# Patient Record
Sex: FEMALE | Race: WHITE | NOT HISPANIC OR LATINO | URBAN - METROPOLITAN AREA
[De-identification: names, ages, dates, MRNs, and addresses within clinical notes are randomized per-mention and may not be internally consistent; named-entity substitution may affect disease eponyms.]

---

## 2017-08-16 ENCOUNTER — APPOINTMENT (OUTPATIENT)
Dept: URBAN - METROPOLITAN AREA CLINIC 219 | Age: 47
Setting detail: DERMATOLOGY
End: 2017-08-16

## 2017-08-18 ENCOUNTER — APPOINTMENT (OUTPATIENT)
Dept: URBAN - METROPOLITAN AREA CLINIC 219 | Age: 47
Setting detail: DERMATOLOGY
End: 2017-08-18

## 2017-08-18 DIAGNOSIS — L30.8 OTHER SPECIFIED DERMATITIS: ICD-10-CM

## 2017-08-18 PROBLEM — L30.9 DERMATITIS, UNSPECIFIED: Status: ACTIVE | Noted: 2017-08-18

## 2017-08-18 PROCEDURE — OTHER PRESCRIPTION: OTHER

## 2017-08-18 PROCEDURE — 99213 OFFICE O/P EST LOW 20 MIN: CPT

## 2017-08-18 PROCEDURE — OTHER TREATMENT REGIMEN: OTHER

## 2017-08-18 RX ORDER — TRIAMCINOLONE ACETONIDE 1 MG/G
APPLY CREAM TOPICAL AS DIRECTED
Qty: 1 | Refills: 3 | Status: ERX | COMMUNITY
Start: 2017-08-18

## 2017-08-18 ASSESSMENT — LOCATION SIMPLE DESCRIPTION DERM
LOCATION SIMPLE: RIGHT PRETIBIAL REGION
LOCATION SIMPLE: LEFT THIGH
LOCATION SIMPLE: LEFT PRETIBIAL REGION
LOCATION SIMPLE: RIGHT THIGH

## 2017-08-18 ASSESSMENT — LOCATION DETAILED DESCRIPTION DERM
LOCATION DETAILED: RIGHT PROXIMAL PRETIBIAL REGION
LOCATION DETAILED: LEFT PROXIMAL PRETIBIAL REGION
LOCATION DETAILED: LEFT ANTERIOR DISTAL THIGH
LOCATION DETAILED: RIGHT ANTERIOR DISTAL THIGH

## 2017-08-18 ASSESSMENT — LOCATION ZONE DERM: LOCATION ZONE: LEG

## 2017-08-18 NOTE — HPI: OTHER
Condition:: Rash
Please Describe Your Condition:: Located on the legs. The rash has been present for 5 days and is red and itching. Patient has tried treating with nystatin, desoximetasone cream, and Lotrimin cream. On Tuesday, Dr. Elam recommended patient start taking Allegra 180 mg in the morning and Benadryl 25-50 mg at night. Patient states since starting the Allegra in the morning and Benadryl at night the rash has improved. Patient denies any new medications, soaps, or detergents. Patient states the rash also flared in June 2017.

## 2017-08-18 NOTE — PROCEDURE: TREATMENT REGIMEN
Plan: Emollients (Cetaphil or Cerave cream) \\nMild Cleansers \\nTriamcinolone cream twice a day x2 weeks, then 1-2 times a week as needed
Detail Level: Simple
Continue Regimen: Allegra 180 mg each morning \\nBenadryl 25-50 mg each night (sedation warning)

## 2017-10-03 ENCOUNTER — APPOINTMENT (OUTPATIENT)
Dept: URBAN - METROPOLITAN AREA CLINIC 219 | Age: 47
Setting detail: DERMATOLOGY
End: 2017-10-03

## 2017-10-03 DIAGNOSIS — L82.0 INFLAMED SEBORRHEIC KERATOSIS: ICD-10-CM

## 2017-10-03 DIAGNOSIS — L82.1 OTHER SEBORRHEIC KERATOSIS: ICD-10-CM

## 2017-10-03 DIAGNOSIS — D22 MELANOCYTIC NEVI: ICD-10-CM

## 2017-10-03 PROBLEM — D22.4 MELANOCYTIC NEVI OF SCALP AND NECK: Status: ACTIVE | Noted: 2017-10-03

## 2017-10-03 PROCEDURE — 99213 OFFICE O/P EST LOW 20 MIN: CPT | Mod: 25

## 2017-10-03 PROCEDURE — OTHER TREATMENT REGIMEN: OTHER

## 2017-10-03 PROCEDURE — OTHER LIQUID NITROGEN: OTHER

## 2017-10-03 PROCEDURE — OTHER MIPS QUALITY: OTHER

## 2017-10-03 PROCEDURE — OTHER REASSURANCE: OTHER

## 2017-10-03 PROCEDURE — 17110 DESTRUCT B9 LESION 1-14: CPT

## 2017-10-03 ASSESSMENT — LOCATION DETAILED DESCRIPTION DERM
LOCATION DETAILED: RIGHT ELBOW
LOCATION DETAILED: RIGHT PROXIMAL DORSAL FOREARM
LOCATION DETAILED: LEFT MEDIAL MALAR CHEEK
LOCATION DETAILED: LEFT INFERIOR CENTRAL MALAR CHEEK
LOCATION DETAILED: LEFT SUPERIOR LATERAL MALAR CHEEK
LOCATION DETAILED: RIGHT LATERAL TRAPEZIAL NECK
LOCATION DETAILED: LEFT SUPERIOR MEDIAL MALAR CHEEK
LOCATION DETAILED: RIGHT INFERIOR ANTERIOR NECK
LOCATION DETAILED: LEFT INFERIOR LATERAL NECK
LOCATION DETAILED: LEFT LATERAL BUCCAL CHEEK
LOCATION DETAILED: LEFT MEDIAL TRAPEZIAL NECK

## 2017-10-03 ASSESSMENT — LOCATION ZONE DERM
LOCATION ZONE: NECK
LOCATION ZONE: FACE
LOCATION ZONE: ARM

## 2017-10-03 ASSESSMENT — LOCATION SIMPLE DESCRIPTION DERM
LOCATION SIMPLE: LEFT CHEEK
LOCATION SIMPLE: RIGHT ANTERIOR NECK
LOCATION SIMPLE: POSTERIOR NECK
LOCATION SIMPLE: LEFT ANTERIOR NECK
LOCATION SIMPLE: RIGHT ELBOW
LOCATION SIMPLE: RIGHT FOREARM

## 2017-10-03 NOTE — PROCEDURE: LIQUID NITROGEN
Add 52 Modifier (Optional): no
Consent: The patient's consent was obtained including but not limited to risks of crusting, scabbing, blistering, scarring, darker or lighter pigmentary change, recurrence, incomplete removal and infection.
Medical Necessity Information: It is in your best interest to select a reason for this procedure from the list below. All of these items fulfill various CMS LCD requirements except the new and changing color options.
Post-Care Instructions: I reviewed with the patient in detail post-care instructions. Patient is to wear sunprotection, and avoid picking at any of the treated lesions. Pt may apply Vaseline to crusted or scabbing areas.
Detail Level: Detailed
Medical Necessity Clause: This procedure was medically necessary because the lesions that were treated were:

## 2018-05-03 ENCOUNTER — APPOINTMENT (OUTPATIENT)
Dept: URBAN - METROPOLITAN AREA CLINIC 219 | Age: 48
Setting detail: DERMATOLOGY
End: 2018-05-03

## 2018-05-03 DIAGNOSIS — H01.13 ECZEMATOUS DERMATITIS OF EYELID: ICD-10-CM

## 2018-05-03 PROBLEM — H01.135 ECZEMATOUS DERMATITIS OF LEFT LOWER EYELID: Status: ACTIVE | Noted: 2018-05-03

## 2018-05-03 PROBLEM — H01.136 ECZEMATOUS DERMATITIS OF LEFT EYE, UNSPECIFIED EYELID: Status: ACTIVE | Noted: 2018-05-03

## 2018-05-03 PROBLEM — L70.0 ACNE VULGARIS: Status: ACTIVE | Noted: 2018-05-03

## 2018-05-03 PROBLEM — J30.1 ALLERGIC RHINITIS DUE TO POLLEN: Status: ACTIVE | Noted: 2018-05-03

## 2018-05-03 PROBLEM — M12.9 ARTHROPATHY, UNSPECIFIED: Status: ACTIVE | Noted: 2018-05-03

## 2018-05-03 PROCEDURE — OTHER TREATMENT REGIMEN: OTHER

## 2018-05-03 PROCEDURE — 99212 OFFICE O/P EST SF 10 MIN: CPT

## 2018-05-03 PROCEDURE — OTHER PRESCRIPTION: OTHER

## 2018-05-03 RX ORDER — FLUTICASONE PROPIONATE 0.05 MG/G
APPLY OINTMENT TOPICAL
Qty: 1 | Refills: 3 | Status: ERX | COMMUNITY
Start: 2018-05-03

## 2018-05-03 ASSESSMENT — LOCATION ZONE DERM: LOCATION ZONE: EYELID

## 2018-05-03 ASSESSMENT — LOCATION DETAILED DESCRIPTION DERM
LOCATION DETAILED: LEFT MEDIAL INFERIOR EYELID
LOCATION DETAILED: LEFT LATERAL CANTHUS

## 2018-05-03 ASSESSMENT — LOCATION SIMPLE DESCRIPTION DERM
LOCATION SIMPLE: LEFT EYELID
LOCATION SIMPLE: LEFT INFERIOR EYELID

## 2018-05-03 NOTE — PROCEDURE: TREATMENT REGIMEN
Detail Level: Simple
Plan: fluticasone 0.005 % topical ointment: Apply to eyelids twice a day x 1 week, then 1-2 times a week as needed\\nVaseline at night as needed \\nMild cleansers

## 2018-11-01 ENCOUNTER — APPOINTMENT (OUTPATIENT)
Dept: URBAN - METROPOLITAN AREA CLINIC 219 | Age: 48
Setting detail: DERMATOLOGY
End: 2018-11-01

## 2018-11-01 DIAGNOSIS — L71.0 PERIORAL DERMATITIS: ICD-10-CM

## 2018-11-01 PROBLEM — L30.9 DERMATITIS, UNSPECIFIED: Status: ACTIVE | Noted: 2018-11-01

## 2018-11-01 PROCEDURE — OTHER MIPS QUALITY: OTHER

## 2018-11-01 PROCEDURE — OTHER TREATMENT REGIMEN: OTHER

## 2018-11-01 PROCEDURE — OTHER PRESCRIPTION: OTHER

## 2018-11-01 PROCEDURE — 99213 OFFICE O/P EST LOW 20 MIN: CPT

## 2018-11-01 RX ORDER — CLINDAMYCIN PHOSPHATE 10 MG/ML
APPLY LOTION TOPICAL TWICE A DAY
Qty: 1 | Refills: 3 | Status: ERX | COMMUNITY
Start: 2018-11-01

## 2018-11-01 RX ORDER — DOXYCYCLINE 100 MG/1
1 CAPSULE ORAL TWICE A DAY
Qty: 30 | Refills: 3 | Status: ERX | COMMUNITY
Start: 2018-11-01

## 2018-11-01 RX ORDER — FLUCONAZOLE 150 MG/1
1 TABLET ORAL AS NEEDED
Qty: 1 | Refills: 3 | Status: ERX | COMMUNITY
Start: 2018-11-01

## 2018-11-01 ASSESSMENT — LOCATION DETAILED DESCRIPTION DERM
LOCATION DETAILED: LEFT LATERAL CANTHUS
LOCATION DETAILED: LEFT MEDIAL INFERIOR EYELID

## 2018-11-01 ASSESSMENT — LOCATION SIMPLE DESCRIPTION DERM
LOCATION SIMPLE: LEFT EYELID
LOCATION SIMPLE: LEFT INFERIOR EYELID

## 2018-11-01 ASSESSMENT — LOCATION ZONE DERM: LOCATION ZONE: EYELID

## 2018-11-01 NOTE — PROCEDURE: TREATMENT REGIMEN
Detail Level: Simple
Plan: Clindamycin Lotion twice a day \\nDoxycycline Monohydrate 100 mg qd with food / water\\nDiflucan 150 mg take 1 as needed for yeast infection\\nPatient to try to avoid using the Fluticasone Cream and Vaseline as much as possible
Otc Regimen: Cetaphil Cleanser

## 2020-02-18 ENCOUNTER — RX ONLY (RX ONLY)
Age: 50
End: 2020-02-18

## 2020-02-18 ENCOUNTER — APPOINTMENT (OUTPATIENT)
Dept: URBAN - METROPOLITAN AREA CLINIC 219 | Age: 50
Setting detail: DERMATOLOGY
End: 2020-02-18

## 2020-02-18 DIAGNOSIS — L30.4 ERYTHEMA INTERTRIGO: ICD-10-CM

## 2020-02-18 DIAGNOSIS — L71.0 PERIORAL DERMATITIS: ICD-10-CM

## 2020-02-18 PROCEDURE — 99213 OFFICE O/P EST LOW 20 MIN: CPT

## 2020-02-18 PROCEDURE — OTHER PRESCRIPTION: OTHER

## 2020-02-18 PROCEDURE — OTHER MEDICATION COUNSELING: OTHER

## 2020-02-18 PROCEDURE — OTHER MIPS QUALITY: OTHER

## 2020-02-18 PROCEDURE — OTHER TREATMENT REGIMEN: OTHER

## 2020-02-18 RX ORDER — ECONAZOLE NITRATE 10 MG/G
APPLY CREAM TOPICAL
Qty: 1 | Refills: 5 | Status: ERX | COMMUNITY
Start: 2020-02-18

## 2020-02-18 RX ORDER — CLINDAMYCIN PHOSPHATE 10 MG/ML
APPLY LOTION TOPICAL TWICE A DAY
Qty: 1 | Refills: 5 | Status: ERX

## 2020-02-18 ASSESSMENT — LOCATION SIMPLE DESCRIPTION DERM
LOCATION SIMPLE: LEFT CHEEK
LOCATION SIMPLE: GROIN

## 2020-02-18 ASSESSMENT — LOCATION DETAILED DESCRIPTION DERM
LOCATION DETAILED: LEFT SUPRAPUBIC SKIN
LOCATION DETAILED: LEFT INFERIOR MEDIAL MALAR CHEEK

## 2020-02-18 ASSESSMENT — LOCATION ZONE DERM
LOCATION ZONE: TRUNK
LOCATION ZONE: FACE

## 2020-02-18 NOTE — PROCEDURE: TREATMENT REGIMEN
Detail Level: Simple
Plan: Clindamycin Lotion: apply twice a day \\nDoxycycline monohydrate 100 mg: take one by mouth once a day with food and water. Do not lie down for one hour after taking.\\nDiflucan 150 mg: Take 1 pill as needed for yeast infections \\nPatient to discontinue triamcinolone\\nCleanse face with dove fragrance-free bar soap
Plan: Econazole cream: Apply twice a day as needed for irritation under abdomen\\nOkay to apply fluticasone twice a week as needed
Detail Level: Zone

## 2020-02-18 NOTE — PROCEDURE: MEDICATION COUNSELING
Xelelinaz Pregnancy And Lactation Text: This medication is Pregnancy Category D and is not considered safe during pregnancy.  The risk during breast feeding is also uncertain.

## 2020-12-08 ENCOUNTER — APPOINTMENT (OUTPATIENT)
Dept: URBAN - METROPOLITAN AREA CLINIC 219 | Age: 50
Setting detail: DERMATOLOGY
End: 2020-12-08

## 2020-12-08 DIAGNOSIS — B02.9 ZOSTER WITHOUT COMPLICATIONS: ICD-10-CM

## 2020-12-08 PROCEDURE — OTHER PRESCRIPTION: OTHER

## 2020-12-08 PROCEDURE — OTHER TREATMENT REGIMEN: OTHER

## 2020-12-08 PROCEDURE — OTHER COUNSELING: OTHER

## 2020-12-08 PROCEDURE — 99213 OFFICE O/P EST LOW 20 MIN: CPT

## 2020-12-08 RX ORDER — VALACYCLOVIR 1 G/1
1 TABLET, FILM COATED ORAL THREE TIMES A DAY
Qty: 30 | Refills: 0 | Status: ERX | COMMUNITY
Start: 2020-12-08

## 2020-12-08 ASSESSMENT — LOCATION ZONE DERM: LOCATION ZONE: TRUNK

## 2020-12-08 ASSESSMENT — LOCATION SIMPLE DESCRIPTION DERM: LOCATION SIMPLE: LEFT UPPER BACK

## 2020-12-08 ASSESSMENT — LOCATION DETAILED DESCRIPTION DERM: LOCATION DETAILED: LEFT SUPERIOR LATERAL UPPER BACK

## 2020-12-08 NOTE — PROCEDURE: TREATMENT REGIMEN
Detail Level: Simple
Plan: valacyclovir 1 gram —-1 pill three times a day x 10 days \\nTriamcinolone Cream twice a day x 3 days \\nPolysporin ointment to the area once it has crusted over

## 2021-06-10 NOTE — PROCEDURE: MEDICATION COUNSELING
pt c/o nausa, provided pt with emesis bag and refreshed ice water, rn
informed, no further needs at this time Dapsone Pregnancy And Lactation Text: This medication is Pregnancy Category C and is not considered safe during pregnancy or breast feeding.

## 2021-06-22 ENCOUNTER — APPOINTMENT (OUTPATIENT)
Dept: URBAN - NONMETROPOLITAN AREA CLINIC 45 | Age: 51
Setting detail: DERMATOLOGY
End: 2021-06-22

## 2021-06-22 DIAGNOSIS — L71.0 PERIORAL DERMATITIS: ICD-10-CM

## 2021-06-22 DIAGNOSIS — L82.0 INFLAMED SEBORRHEIC KERATOSIS: ICD-10-CM

## 2021-06-22 DIAGNOSIS — L30.4 ERYTHEMA INTERTRIGO: ICD-10-CM

## 2021-06-22 PROCEDURE — OTHER PRESCRIPTION: OTHER

## 2021-06-22 PROCEDURE — OTHER TREATMENT REGIMEN: OTHER

## 2021-06-22 PROCEDURE — OTHER MEDICATION COUNSELING: OTHER

## 2021-06-22 PROCEDURE — 99213 OFFICE O/P EST LOW 20 MIN: CPT | Mod: 25

## 2021-06-22 PROCEDURE — 17110 DESTRUCT B9 LESION 1-14: CPT

## 2021-06-22 PROCEDURE — OTHER LIQUID NITROGEN: OTHER

## 2021-06-22 RX ORDER — ECONAZOLE NITRATE 10 MG/G
APPLY CREAM TOPICAL
Qty: 1 | Refills: 5 | Status: ERX

## 2021-06-22 ASSESSMENT — LOCATION SIMPLE DESCRIPTION DERM
LOCATION SIMPLE: NECK
LOCATION SIMPLE: LEFT CHEEK
LOCATION SIMPLE: GROIN

## 2021-06-22 ASSESSMENT — LOCATION ZONE DERM
LOCATION ZONE: FACE
LOCATION ZONE: NECK
LOCATION ZONE: TRUNK

## 2021-06-22 ASSESSMENT — LOCATION DETAILED DESCRIPTION DERM
LOCATION DETAILED: LEFT SUPRAPUBIC SKIN
LOCATION DETAILED: LEFT CENTRAL LATERAL NECK
LOCATION DETAILED: LEFT INFERIOR MEDIAL MALAR CHEEK

## 2021-06-22 NOTE — PROCEDURE: TREATMENT REGIMEN
Plan: Clindamycin Lotion: apply twice a day as needed\\nDoxycycline monohydrate 100 mg: take one by mouth once a day (as needed)- take with food and water. Do not lie down for one hour after taking. \\nCleanse face with dove fragrance-free bar soap
Detail Level: Simple
Plan: Econazole cream: Apply twice a day as needed for irritation under abdomen
Detail Level: Zone

## 2021-06-25 ENCOUNTER — RX ONLY (RX ONLY)
Age: 51
End: 2021-06-25

## 2021-06-25 RX ORDER — ECONAZOLE NITRATE 10 MG/G
APPLY CREAM TOPICAL TWICE A DAY
Qty: 1 | Refills: 5 | Status: ERX

## 2022-05-05 ENCOUNTER — OFFICE VISIT (OUTPATIENT)
Dept: URBAN - NONMETROPOLITAN AREA CLINIC 13 | Facility: CLINIC | Age: 52
End: 2022-05-05
Payer: COMMERCIAL

## 2022-05-05 ENCOUNTER — WEB ENCOUNTER (OUTPATIENT)
Dept: URBAN - NONMETROPOLITAN AREA CLINIC 13 | Facility: CLINIC | Age: 52
End: 2022-05-05

## 2022-05-05 ENCOUNTER — LAB OUTSIDE AN ENCOUNTER (OUTPATIENT)
Dept: URBAN - NONMETROPOLITAN AREA CLINIC 13 | Facility: CLINIC | Age: 52
End: 2022-05-05

## 2022-05-05 VITALS
SYSTOLIC BLOOD PRESSURE: 124 MMHG | TEMPERATURE: 97.6 F | DIASTOLIC BLOOD PRESSURE: 74 MMHG | HEIGHT: 64 IN | BODY MASS INDEX: 40.26 KG/M2 | WEIGHT: 235.8 LBS | HEART RATE: 93 BPM

## 2022-05-05 DIAGNOSIS — Z86.010 PERSONAL HISTORY OF COLONIC POLYPS: ICD-10-CM

## 2022-05-05 DIAGNOSIS — K21.9 GERD WITHOUT ESOPHAGITIS: ICD-10-CM

## 2022-05-05 DIAGNOSIS — Z12.11 COLON CANCER SCREENING: ICD-10-CM

## 2022-05-05 PROCEDURE — 99204 OFFICE O/P NEW MOD 45 MIN: CPT | Performed by: NURSE PRACTITIONER

## 2022-05-05 RX ORDER — SUCRALFATE 1 G
1 TABLET ON AN EMPTY STOMACH TABLET ORAL TWICE A DAY
Qty: 60 | OUTPATIENT
Start: 2022-05-05 | End: 2022-06-04

## 2022-05-05 RX ORDER — PANTOPRAZOLE SODIUM 40 MG/1
1 TABLET TABLET, DELAYED RELEASE ORAL TWICE A DAY
Qty: 180 TABLET | Refills: 3 | OUTPATIENT
Start: 2022-05-05

## 2022-05-05 RX ORDER — FEXOFENADINE HYDROCHLORIDE 180 MG/1
TABLET, FILM COATED ORAL
Qty: 0 | Refills: 0 | Status: ACTIVE | COMMUNITY
Start: 1900-01-01 | End: 1900-01-01

## 2022-05-05 RX ORDER — PROGESTERONE 50 MG/ML
INJECTION, SOLUTION INTRAMUSCULAR
Qty: 0 | Refills: 0 | Status: ACTIVE | COMMUNITY
Start: 1900-01-01 | End: 1900-01-01

## 2022-05-05 RX ORDER — CETIRIZINE HYDROCHLORIDE 10 MG/1
TABLET, FILM COATED ORAL
Qty: 0 | Refills: 0 | Status: ACTIVE | COMMUNITY
Start: 1900-01-01 | End: 1900-01-01

## 2022-05-05 RX ORDER — HYOSCYAMINE SULFATE 0.12 MG/1
1 TABLET UNDER THE TONGUE AND ALLOW TO DISSOLVE EVERY 4-6 HRS  AS NEEDED FOR ABDOMINAL PAIN/SPASM TABLET, ORALLY DISINTEGRATING ORAL
Qty: 90 | Refills: 3 | OUTPATIENT
Start: 2022-05-05 | End: 2022-09-02

## 2022-05-05 RX ORDER — FLUOXETINE HCL 20 MG
CAPSULE ORAL
Qty: 0 | Refills: 0 | Status: ACTIVE | COMMUNITY
Start: 1900-01-01 | End: 1900-01-01

## 2022-05-05 RX ORDER — FLUTICASONE PROPIONATE 50 UG/1
SPRAY, METERED NASAL
Qty: 0 | Refills: 0 | Status: ACTIVE | COMMUNITY
Start: 1900-01-01 | End: 1900-01-01

## 2022-05-05 NOTE — HPI-TODAY'S VISIT:
5/6/2022 Ms. Chelsie Ash is a 51 year old female here for epigastric pain and reflux. She was last seen in 2018 for hemorrhoids. She is a previous patient of Dr Castillo. Her last EGD was 10/2016 for painful swallowing. Her EGD was normal except for gastritis and benign polyps. She has been on nexium 20mg daily since she was pregnant many years ago. Over this last year, she has had a lot of stress. She has been having more epigastric pain and discomfort. She is taking maalox most days. She is concerned about esophageal damage and barretts. She has failed omeprazole in the past. Her last colonoscopy was 2015 with Dr Castillo with two TA polyps. She is due for colonoscopy. CS

## 2022-05-06 ENCOUNTER — LAB OUTSIDE AN ENCOUNTER (OUTPATIENT)
Dept: URBAN - NONMETROPOLITAN AREA CLINIC 13 | Facility: CLINIC | Age: 52
End: 2022-05-06

## 2022-05-06 ENCOUNTER — TELEPHONE ENCOUNTER (OUTPATIENT)
Dept: URBAN - METROPOLITAN AREA CLINIC 92 | Facility: CLINIC | Age: 52
End: 2022-05-06

## 2022-08-18 ENCOUNTER — WEB ENCOUNTER (OUTPATIENT)
Dept: URBAN - NONMETROPOLITAN AREA SURGERY CENTER 1 | Facility: SURGERY CENTER | Age: 52
End: 2022-08-18

## 2022-08-18 ENCOUNTER — TELEPHONE ENCOUNTER (OUTPATIENT)
Dept: URBAN - NONMETROPOLITAN AREA CLINIC 2 | Facility: CLINIC | Age: 52
End: 2022-08-18

## 2022-08-23 ENCOUNTER — CLAIMS CREATED FROM THE CLAIM WINDOW (OUTPATIENT)
Dept: URBAN - NONMETROPOLITAN AREA SURGERY CENTER 1 | Facility: SURGERY CENTER | Age: 52
End: 2022-08-23
Payer: COMMERCIAL

## 2022-08-23 ENCOUNTER — OFFICE VISIT (OUTPATIENT)
Dept: URBAN - NONMETROPOLITAN AREA SURGERY CENTER 1 | Facility: SURGERY CENTER | Age: 52
End: 2022-08-23

## 2022-08-23 ENCOUNTER — CLAIMS CREATED FROM THE CLAIM WINDOW (OUTPATIENT)
Dept: URBAN - METROPOLITAN AREA CLINIC 4 | Facility: CLINIC | Age: 52
End: 2022-08-23
Payer: COMMERCIAL

## 2022-08-23 DIAGNOSIS — K31.7 POLYP OF STOMACH AND DUODENUM: ICD-10-CM

## 2022-08-23 DIAGNOSIS — D12.4 ADENOMA OF DESCENDING COLON: ICD-10-CM

## 2022-08-23 DIAGNOSIS — D12.4 BENIGN NEOPLASM OF DESCENDING COLON: ICD-10-CM

## 2022-08-23 DIAGNOSIS — K22.89 OTHER SPECIFIED DISEASE OF ESOPHAGUS: ICD-10-CM

## 2022-08-23 DIAGNOSIS — R10.13 ABDOMINAL DISCOMFORT, EPIGASTRIC: ICD-10-CM

## 2022-08-23 DIAGNOSIS — Z86.010 ADENOMAS PERSONAL HISTORY OF COLONIC POLYPS: ICD-10-CM

## 2022-08-23 DIAGNOSIS — K31.7 BENIGN GASTRIC POLYP: ICD-10-CM

## 2022-08-23 PROCEDURE — 88312 SPECIAL STAINS GROUP 1: CPT | Performed by: PATHOLOGY

## 2022-08-23 PROCEDURE — 45385 COLONOSCOPY W/LESION REMOVAL: CPT | Performed by: INTERNAL MEDICINE

## 2022-08-23 PROCEDURE — 43239 EGD BIOPSY SINGLE/MULTIPLE: CPT | Performed by: INTERNAL MEDICINE

## 2022-08-23 PROCEDURE — G8907 PT DOC NO EVENTS ON DISCHARG: HCPCS | Performed by: INTERNAL MEDICINE

## 2022-08-23 PROCEDURE — 88305 TISSUE EXAM BY PATHOLOGIST: CPT | Performed by: PATHOLOGY

## 2022-09-15 ENCOUNTER — OFFICE VISIT (OUTPATIENT)
Dept: URBAN - NONMETROPOLITAN AREA CLINIC 13 | Facility: CLINIC | Age: 52
End: 2022-09-15

## 2022-10-27 ENCOUNTER — TELEPHONE ENCOUNTER (OUTPATIENT)
Dept: URBAN - METROPOLITAN AREA CLINIC 92 | Facility: CLINIC | Age: 52
End: 2022-10-27

## 2022-10-27 ENCOUNTER — OFFICE VISIT (OUTPATIENT)
Dept: URBAN - NONMETROPOLITAN AREA CLINIC 13 | Facility: CLINIC | Age: 52
End: 2022-10-27
Payer: COMMERCIAL

## 2022-10-27 VITALS
WEIGHT: 237 LBS | SYSTOLIC BLOOD PRESSURE: 122 MMHG | BODY MASS INDEX: 40.46 KG/M2 | TEMPERATURE: 97.1 F | DIASTOLIC BLOOD PRESSURE: 85 MMHG | HEIGHT: 64 IN | HEART RATE: 80 BPM

## 2022-10-27 DIAGNOSIS — Z12.11 COLON CANCER SCREENING: ICD-10-CM

## 2022-10-27 DIAGNOSIS — R19.4 BOWEL HABIT CHANGES: ICD-10-CM

## 2022-10-27 DIAGNOSIS — K21.9 GERD WITHOUT ESOPHAGITIS: ICD-10-CM

## 2022-10-27 DIAGNOSIS — Z86.010 PERSONAL HISTORY OF COLONIC POLYPS: ICD-10-CM

## 2022-10-27 DIAGNOSIS — K22.70 BARRETT'S ESOPHAGUS WITHOUT DYSPLASIA: ICD-10-CM

## 2022-10-27 PROBLEM — 428283002: Status: ACTIVE | Noted: 2022-05-06

## 2022-10-27 PROBLEM — 302914006: Status: ACTIVE | Noted: 2022-10-27

## 2022-10-27 PROCEDURE — 99213 OFFICE O/P EST LOW 20 MIN: CPT | Performed by: NURSE PRACTITIONER

## 2022-10-27 RX ORDER — CETIRIZINE HYDROCHLORIDE 10 MG/1
TABLET, FILM COATED ORAL
Qty: 0 | Refills: 0 | Status: ACTIVE | COMMUNITY
Start: 1900-01-01

## 2022-10-27 RX ORDER — FEXOFENADINE HYDROCHLORIDE 180 MG/1
TABLET, FILM COATED ORAL
Qty: 0 | Refills: 0 | Status: ACTIVE | COMMUNITY
Start: 1900-01-01

## 2022-10-27 RX ORDER — PANTOPRAZOLE SODIUM 40 MG/1
1 TABLET TABLET, DELAYED RELEASE ORAL TWICE A DAY
Qty: 180 TABLET | Refills: 3 | Status: ACTIVE | COMMUNITY
Start: 2022-05-05

## 2022-10-27 RX ORDER — PANTOPRAZOLE SODIUM 40 MG/1
1 TABLET TABLET, DELAYED RELEASE ORAL ONCE A DAY
Qty: 90 TABLET | Refills: 3 | OUTPATIENT

## 2022-10-27 RX ORDER — LISDEXAMFETAMINE DIMESYLATE 30 MG/1
1 CAPSULE IN THE MORNING CAPSULE ORAL ONCE A DAY
Status: ACTIVE | COMMUNITY

## 2022-10-27 RX ORDER — FLUOXETINE HCL 20 MG
CAPSULE ORAL
Qty: 0 | Refills: 0 | Status: DISCONTINUED | COMMUNITY
Start: 1900-01-01

## 2022-10-27 RX ORDER — VILAZODONE HYDROCHLORIDE 40 MG/1
1 TABLET WITH FOOD TABLET ORAL ONCE A DAY
Status: ACTIVE | COMMUNITY

## 2022-10-27 RX ORDER — PROGESTERONE 50 MG/ML
INJECTION, SOLUTION INTRAMUSCULAR
Qty: 0 | Refills: 0 | Status: DISCONTINUED | COMMUNITY
Start: 1900-01-01

## 2022-10-27 RX ORDER — FLUTICASONE PROPIONATE 50 UG/1
SPRAY, METERED NASAL
Qty: 0 | Refills: 0 | Status: ACTIVE | COMMUNITY
Start: 1900-01-01

## 2022-10-27 NOTE — HPI-TODAY'S VISIT:
10/27/2022 Ms. Chelsie Ash is here for epigastric pain and reflux.  Her EGD showed possible Barretts- path negative, gastritis and benign polyps. She was given protonxi 40mg BID, carafate, and levsin. She called after her EGD and was still having a lot of pain, burning, and nausea. She could not get in. She saw her GYN and started on Vyvance and her hormones were stopped. This has really helped. She has been able to come off the carafate, levsin, and drop the protonix to once daily. Her stools are now a little loose.  CS

## 2022-10-27 NOTE — HPI-OTHER HISTORIES
5/6/2022 Ms. Chelsie Ash is a 51 year old female here for epigastric pain and reflux. She was last seen in 2018 for hemorrhoids. She is a previous patient of Dr Castillo. Her last EGD was 10/2016 for painful swallowing. Her EGD was normal except for gastritis and benign polyps. She has been on nexium 20mg daily since she was pregnant many years ago. Over this last year, she has had a lot of stress. She has been having more epigastric pain and discomfort. She is taking maalox most days. She is concerned about esophageal damage and barretts. She has failed omeprazole in the past. Her last colonoscopy was 2015 with Dr Castillo with two TA polyps. She is due for colonoscopy. CS  8/23/2022 EGD: 0.5cm salmon colored mucousa, gastritis, gastric polyps. Path negative for Barretts Colonoscopy: 8mm TA polyp.

## 2022-12-22 NOTE — PROCEDURE: MEDICATION COUNSELING
Rifampin Pregnancy And Lactation Text: This medication is Pregnancy Category C and it isn't know if it is safe during pregnancy. It is also excreted in breast milk and should not be used if you are breast feeding. Azithromycin Pregnancy And Lactation Text: This medication is considered safe during pregnancy and is also secreted in breast milk.

## 2023-02-01 ENCOUNTER — APPOINTMENT (OUTPATIENT)
Dept: URBAN - NONMETROPOLITAN AREA CLINIC 45 | Age: 53
Setting detail: DERMATOLOGY
End: 2023-02-01

## 2023-02-01 DIAGNOSIS — L30.8 OTHER SPECIFIED DERMATITIS: ICD-10-CM

## 2023-02-01 DIAGNOSIS — B07.8 OTHER VIRAL WARTS: ICD-10-CM

## 2023-02-01 DIAGNOSIS — L82.0 INFLAMED SEBORRHEIC KERATOSIS: ICD-10-CM

## 2023-02-01 PROCEDURE — 99213 OFFICE O/P EST LOW 20 MIN: CPT | Mod: 25

## 2023-02-01 PROCEDURE — OTHER MIPS QUALITY: OTHER

## 2023-02-01 PROCEDURE — OTHER TREATMENT REGIMEN: OTHER

## 2023-02-01 PROCEDURE — OTHER LIQUID NITROGEN: OTHER

## 2023-02-01 PROCEDURE — OTHER PRESCRIPTION MEDICATION MANAGEMENT: OTHER

## 2023-02-01 PROCEDURE — 17110 DESTRUCT B9 LESION 1-14: CPT

## 2023-02-01 ASSESSMENT — LOCATION SIMPLE DESCRIPTION DERM
LOCATION SIMPLE: LEFT INDEX FINGER
LOCATION SIMPLE: LEFT MIDDLE FINGER
LOCATION SIMPLE: NECK

## 2023-02-01 ASSESSMENT — LOCATION ZONE DERM
LOCATION ZONE: NECK
LOCATION ZONE: FINGER

## 2023-02-01 ASSESSMENT — LOCATION DETAILED DESCRIPTION DERM
LOCATION DETAILED: LEFT CENTRAL LATERAL NECK
LOCATION DETAILED: LEFT INDEX FINGER PROXIMAL INTERPHALANGEAL JOINT
LOCATION DETAILED: LEFT CENTRAL POSTERIOR NECK
LOCATION DETAILED: LEFT INFERIOR LATERAL NECK
LOCATION DETAILED: PERIUNGUAL SKIN LEFT MIDDLE FINGER

## 2023-02-01 NOTE — PROCEDURE: LIQUID NITROGEN
Spray Paint Text: The liquid nitrogen was applied to the skin utilizing a spray paint frosting technique.
Detail Level: Detailed
Post-Care Instructions: I reviewed with the patient in detail post-care instructions. Patient is to wear sunprotection, and avoid picking at any of the treated lesions. Pt may apply Vaseline to crusted or scabbing areas.
Render Post-Care Instructions In Note?: no
Show Aperture Variable?: Yes
Consent: The patient's consent was obtained including but not limited to risks of crusting, scabbing, blistering, scarring, darker or lighter pigmentary change, recurrence, incomplete removal and infection.
Medical Necessity Information: It is in your best interest to select a reason for this procedure from the list below. All of these items fulfill various CMS LCD requirements except the new and changing color options.
Medical Necessity Clause: This procedure was medically necessary because the lesions that were treated were:

## 2023-02-01 NOTE — PROCEDURE: PRESCRIPTION MEDICATION MANAGEMENT
Render In Strict Bullet Format?: No
Samples Given: Cerave healing ointment for dry skin
Detail Level: Zone

## 2023-03-15 ENCOUNTER — OFFICE VISIT (OUTPATIENT)
Dept: URBAN - NONMETROPOLITAN AREA CLINIC 13 | Facility: CLINIC | Age: 53
End: 2023-03-15
Payer: COMMERCIAL

## 2023-03-15 VITALS
BODY MASS INDEX: 40.05 KG/M2 | HEIGHT: 64 IN | WEIGHT: 234.6 LBS | HEART RATE: 93 BPM | DIASTOLIC BLOOD PRESSURE: 82 MMHG | TEMPERATURE: 98 F | SYSTOLIC BLOOD PRESSURE: 123 MMHG

## 2023-03-15 DIAGNOSIS — R19.4 BOWEL HABIT CHANGES: ICD-10-CM

## 2023-03-15 DIAGNOSIS — Z86.010 PERSONAL HISTORY OF COLONIC POLYPS: ICD-10-CM

## 2023-03-15 DIAGNOSIS — Z12.11 COLON CANCER SCREENING: ICD-10-CM

## 2023-03-15 DIAGNOSIS — K21.9 GERD WITHOUT ESOPHAGITIS: ICD-10-CM

## 2023-03-15 DIAGNOSIS — K22.70 BARRETT'S ESOPHAGUS WITHOUT DYSPLASIA: ICD-10-CM

## 2023-03-15 PROCEDURE — 99214 OFFICE O/P EST MOD 30 MIN: CPT | Performed by: NURSE PRACTITIONER

## 2023-03-15 RX ORDER — FAMOTIDINE 40 MG/1
1 TABLET AT BEDTIME TABLET, FILM COATED ORAL ONCE A DAY
Qty: 90 TABLET | Refills: 3 | OUTPATIENT
Start: 2023-03-15

## 2023-03-15 RX ORDER — CETIRIZINE HYDROCHLORIDE 10 MG/1
TABLET, FILM COATED ORAL
Qty: 0 | Refills: 0 | Status: ACTIVE | COMMUNITY
Start: 1900-01-01

## 2023-03-15 RX ORDER — PANTOPRAZOLE SODIUM 40 MG/1
1 TABLET TABLET, DELAYED RELEASE ORAL ONCE A DAY
Qty: 90 TABLET | Refills: 3 | Status: ACTIVE | COMMUNITY

## 2023-03-15 RX ORDER — FLUTICASONE PROPIONATE 50 UG/1
SPRAY, METERED NASAL
Qty: 0 | Refills: 0 | Status: ACTIVE | COMMUNITY
Start: 1900-01-01

## 2023-03-15 RX ORDER — VILAZODONE HYDROCHLORIDE 40 MG/1
1 TABLET WITH FOOD TABLET ORAL ONCE A DAY
Status: ACTIVE | COMMUNITY

## 2023-03-15 RX ORDER — PANTOPRAZOLE SODIUM 40 MG/1
1 TABLET TABLET, DELAYED RELEASE ORAL TWICE A DAY
Qty: 180 TABLET | Refills: 3 | OUTPATIENT

## 2023-03-15 RX ORDER — LISDEXAMFETAMINE DIMESYLATE 30 MG/1
1 CAPSULE IN THE MORNING CAPSULE ORAL ONCE A DAY
Status: ACTIVE | COMMUNITY

## 2023-03-15 RX ORDER — SUCRALFATE 1 G/1
1 TABLET DISSOLVED IN A TABLESPOON OF WATER ON AN EMPTY STOMACH TABLET ORAL TWICE A DAY
Qty: 60 TABLET | Refills: 11 | OUTPATIENT
Start: 2023-03-15 | End: 2024-03-08

## 2023-03-15 RX ORDER — FEXOFENADINE HYDROCHLORIDE 180 MG/1
TABLET, FILM COATED ORAL
Qty: 0 | Refills: 0 | Status: ACTIVE | COMMUNITY
Start: 1900-01-01

## 2023-03-15 NOTE — HPI-TODAY'S VISIT:
3/15/2023 Ms. Chelsie Ash is here for epigastric pain and reflux.  Her EGD showed possible Barretts- path negative, gastritis and benign polyps. She was given protonxi 40mg BID, carafate, and levsin. She called after her EGD and was still having a lot of pain, burning, and nausea. She could not get in. She saw her GYN and started on Vyvance and her hormones were stopped. This has really helped. She was able to come off the carafate, levsin, and drop the protonix to once daily.. This was ok until the last few months. Her reflux has returned. She is back to taking maalox. If she misses one dose of protonix she is miserable and maalox does not help. She has been under a lot of stress and she feels this is contributing to her symptoms. CS

## 2023-03-15 NOTE — HPI-OTHER HISTORIES
5/6/2022 Ms. Chelsie Ash is a 51 year old female here for epigastric pain and reflux. She was last seen in 2018 for hemorrhoids. She is a previous patient of Dr Castillo. Her last EGD was 10/2016 for painful swallowing. Her EGD was normal except for gastritis and benign polyps. She has been on nexium 20mg daily since she was pregnant many years ago. Over this last year, she has had a lot of stress. She has been having more epigastric pain and discomfort. She is taking maalox most days. She is concerned about esophageal damage and barretts. She has failed omeprazole in the past. Her last colonoscopy was 2015 with Dr Castillo with two TA polyps. She is due for colonoscopy. CS  8/23/2022 EGD: 0.5cm salmon colored mucousa, gastritis, gastric polyps. Path negative for Barretts Colonoscopy: 8mm TA polyp.  10/27/2022 Ms. Chelsie Ash is here for epigastric pain and reflux.  Her EGD showed possible Barretts- path negative, gastritis and benign polyps. She was given protonxi 40mg BID, carafate, and levsin. She called after her EGD and was still having a lot of pain, burning, and nausea. She could not get in. She saw her GYN and started on Vyvance and her hormones were stopped. This has really helped. She has been able to come off the carafate, levsin, and drop the protonix to once daily. Her stools are now a little loose.  CS

## 2023-05-04 ENCOUNTER — APPOINTMENT (OUTPATIENT)
Dept: URBAN - NONMETROPOLITAN AREA CLINIC 45 | Age: 53
Setting detail: DERMATOLOGY
End: 2023-05-04

## 2023-05-04 DIAGNOSIS — L30.8 OTHER SPECIFIED DERMATITIS: ICD-10-CM

## 2023-05-04 DIAGNOSIS — B07.8 OTHER VIRAL WARTS: ICD-10-CM

## 2023-05-04 PROCEDURE — OTHER PRESCRIPTION MEDICATION MANAGEMENT: OTHER

## 2023-05-04 PROCEDURE — OTHER TREATMENT REGIMEN: OTHER

## 2023-05-04 PROCEDURE — OTHER MIPS QUALITY: OTHER

## 2023-05-04 PROCEDURE — 99213 OFFICE O/P EST LOW 20 MIN: CPT

## 2023-05-04 ASSESSMENT — LOCATION DETAILED DESCRIPTION DERM
LOCATION DETAILED: LEFT INDEX FINGER PROXIMAL INTERPHALANGEAL JOINT
LOCATION DETAILED: PERIUNGUAL SKIN LEFT MIDDLE FINGER

## 2023-05-04 ASSESSMENT — LOCATION SIMPLE DESCRIPTION DERM
LOCATION SIMPLE: LEFT MIDDLE FINGER
LOCATION SIMPLE: LEFT INDEX FINGER

## 2023-05-04 ASSESSMENT — LOCATION ZONE DERM: LOCATION ZONE: FINGER

## 2023-05-04 NOTE — PROCEDURE: TREATMENT REGIMEN
Plan: Ln2 applied to the lesions \\nOTC wart stick to any residual after healed from ln2 Tx
Detail Level: Zone

## 2023-06-27 ENCOUNTER — OFFICE VISIT (OUTPATIENT)
Dept: URBAN - NONMETROPOLITAN AREA CLINIC 13 | Facility: CLINIC | Age: 53
End: 2023-06-27
Payer: COMMERCIAL

## 2023-06-27 ENCOUNTER — WEB ENCOUNTER (OUTPATIENT)
Dept: URBAN - NONMETROPOLITAN AREA CLINIC 13 | Facility: CLINIC | Age: 53
End: 2023-06-27

## 2023-06-27 VITALS
HEIGHT: 64 IN | SYSTOLIC BLOOD PRESSURE: 119 MMHG | BODY MASS INDEX: 39.27 KG/M2 | TEMPERATURE: 98.1 F | DIASTOLIC BLOOD PRESSURE: 78 MMHG | WEIGHT: 230 LBS | HEART RATE: 86 BPM

## 2023-06-27 DIAGNOSIS — R19.4 BOWEL HABIT CHANGES: ICD-10-CM

## 2023-06-27 DIAGNOSIS — K22.70 BARRETT'S ESOPHAGUS WITHOUT DYSPLASIA: ICD-10-CM

## 2023-06-27 DIAGNOSIS — Z86.010 PERSONAL HISTORY OF COLONIC POLYPS: ICD-10-CM

## 2023-06-27 DIAGNOSIS — Z12.11 COLON CANCER SCREENING: ICD-10-CM

## 2023-06-27 DIAGNOSIS — K21.9 GERD WITHOUT ESOPHAGITIS: ICD-10-CM

## 2023-06-27 PROCEDURE — 99213 OFFICE O/P EST LOW 20 MIN: CPT | Performed by: NURSE PRACTITIONER

## 2023-06-27 RX ORDER — CETIRIZINE HYDROCHLORIDE 10 MG/1
TABLET, FILM COATED ORAL
Qty: 0 | Refills: 0 | Status: ACTIVE | COMMUNITY
Start: 1900-01-01

## 2023-06-27 RX ORDER — FAMOTIDINE 40 MG/1
1 TABLET AT BEDTIME TABLET, FILM COATED ORAL ONCE A DAY
Qty: 90 TABLET | Refills: 3 | Status: ACTIVE | COMMUNITY
Start: 2023-03-15

## 2023-06-27 RX ORDER — CYCLOBENZAPRINE HYDROCHLORIDE 5 MG/1
1 TABLET AT BEDTIME AS NEEDED TABLET, FILM COATED ORAL ONCE A DAY
Status: ACTIVE | COMMUNITY

## 2023-06-27 RX ORDER — SUCRALFATE 1 G/1
1 TABLET DISSOLVED IN A TABLESPOON OF WATER ON AN EMPTY STOMACH TABLET ORAL TWICE A DAY
Qty: 60 TABLET | Refills: 11 | Status: ACTIVE | COMMUNITY
Start: 2023-03-15 | End: 2024-03-08

## 2023-06-27 RX ORDER — ONDANSETRON 4 MG/1
1 TABLET ON THE TONGUE AND ALLOW TO DISSOLVE TABLET, ORALLY DISINTEGRATING ORAL
Qty: 30 | Refills: 3 | OUTPATIENT
Start: 2023-06-27

## 2023-06-27 RX ORDER — COLESTIPOL HYDROCHLORIDE 1 G/1
1 TABLET 30MINS PRIOR TO A MEAL TABLET, FILM COATED ORAL TWICE A DAY
Qty: 60 TABLET | Refills: 11 | OUTPATIENT
Start: 2023-06-27

## 2023-06-27 RX ORDER — LISDEXAMFETAMINE DIMESYLATE 30 MG/1
1 CAPSULE IN THE MORNING CAPSULE ORAL ONCE A DAY
Status: ON HOLD | COMMUNITY

## 2023-06-27 RX ORDER — FEXOFENADINE HYDROCHLORIDE 180 MG/1
TABLET, FILM COATED ORAL
Qty: 0 | Refills: 0 | Status: ACTIVE | COMMUNITY
Start: 1900-01-01

## 2023-06-27 RX ORDER — CLONIDINE HYDROCHLORIDE 0.1 MG/1
1 TABLET AT BEDTIME TABLET, EXTENDED RELEASE ORAL ONCE A DAY
Status: ACTIVE | COMMUNITY

## 2023-06-27 RX ORDER — PANTOPRAZOLE SODIUM 40 MG/1
1 TABLET TABLET, DELAYED RELEASE ORAL TWICE A DAY
Qty: 180 TABLET | Refills: 3 | OUTPATIENT

## 2023-06-27 RX ORDER — HYOSCYAMINE SULFATE 0.12 MG/1
1 TABLET AS NEEDED TABLET ORAL
Qty: 90 | Refills: 3

## 2023-06-27 RX ORDER — BUPROPION HYDROCHLORIDE 300 MG/1
1 TABLET IN THE MORNING TABLET, EXTENDED RELEASE ORAL ONCE A DAY
Status: ACTIVE | COMMUNITY

## 2023-06-27 RX ORDER — FAMOTIDINE 40 MG/1
1 TABLET AT BEDTIME TABLET, FILM COATED ORAL ONCE A DAY
Qty: 90 TABLET | Refills: 3 | OUTPATIENT

## 2023-06-27 RX ORDER — VILAZODONE HYDROCHLORIDE 40 MG/1
1 TABLET WITH FOOD TABLET ORAL ONCE A DAY
Status: ACTIVE | COMMUNITY

## 2023-06-27 RX ORDER — HYOSCYAMINE SULFATE 0.12 MG/1
1 TABLET AS NEEDED TABLET ORAL
Status: ACTIVE | COMMUNITY

## 2023-06-27 RX ORDER — SUCRALFATE 1 G/1
1 TABLET DISSOLVED IN A TABLESPOON OF WATER ON AN EMPTY STOMACH TABLET ORAL TWICE A DAY
Qty: 60 TABLET | Refills: 11 | OUTPATIENT

## 2023-06-27 RX ORDER — FLUTICASONE PROPIONATE 50 UG/1
SPRAY, METERED NASAL
Qty: 0 | Refills: 0 | Status: ACTIVE | COMMUNITY
Start: 1900-01-01

## 2023-06-27 RX ORDER — PANTOPRAZOLE SODIUM 40 MG/1
1 TABLET TABLET, DELAYED RELEASE ORAL TWICE A DAY
Qty: 180 TABLET | Refills: 3 | Status: ACTIVE | COMMUNITY

## 2023-06-27 NOTE — HPI-OTHER HISTORIES
5/6/2022 Ms. Chelsie Ash is a 51 year old female here for epigastric pain and reflux. She was last seen in 2018 for hemorrhoids. She is a previous patient of Dr Castillo. Her last EGD was 10/2016 for painful swallowing. Her EGD was normal except for gastritis and benign polyps. She has been on nexium 20mg daily since she was pregnant many years ago. Over this last year, she has had a lot of stress. She has been having more epigastric pain and discomfort. She is taking maalox most days. She is concerned about esophageal damage and barretts. She has failed omeprazole in the past. Her last colonoscopy was 2015 with Dr Castillo with two TA polyps. She is due for colonoscopy. CS  8/23/2022 EGD: 0.5cm salmon colored mucousa, gastritis, gastric polyps. Path negative for Barretts Colonoscopy: 8mm TA polyp.  10/27/2022 Ms. Chelsie Ash is here for epigastric pain and reflux.  Her EGD showed possible Barretts- path negative, gastritis and benign polyps. She was given protonxi 40mg BID, carafate, and levsin. She called after her EGD and was still having a lot of pain, burning, and nausea. She could not get in. She saw her GYN and started on Vyvance and her hormones were stopped. This has really helped. She has been able to come off the carafate, levsin, and drop the protonix to once daily. Her stools are now a little loose.  CS  3/15/2023 Ms. Chelsie Ash is here for epigastric pain and reflux.  Her EGD showed possible Barretts- path negative, gastritis and benign polyps. She was given protonxi 40mg BID, carafate, and levsin. She called after her EGD and was still having a lot of pain, burning, and nausea. She could not get in. She saw her GYN and started on Vyvance and her hormones were stopped. This has really helped. She was able to come off the carafate, levsin, and drop the protonix to once daily.. This was ok until the last few months. Her reflux has returned. She is back to taking maalox. If she misses one dose of protonix she is miserable and maalox does not help. She has been under a lot of stress and she feels this is contributing to her symptoms. CS

## 2023-06-27 NOTE — HPI-TODAY'S VISIT:
6/27/2023 Ms. Chelsie Mcrae-Deniz is here for epigastric pain and reflux.  Her EGD showed possible Barretts- path negative, gastritis and benign polyps in August. She was given protonxi 40mg BID, carafate, and levsin. This improved and then returned when she came off everything but the protonix daily. She restarted a week ago with improvement. She is seeing cardiology in August. She is preparing for a trip to [place]. She is also complaining of urgent loose stool after eating out. She had her GB out in 2018. Her bowels are normal otherwise. CS

## 2023-08-31 ENCOUNTER — APPOINTMENT (OUTPATIENT)
Dept: URBAN - NONMETROPOLITAN AREA CLINIC 45 | Age: 53
Setting detail: DERMATOLOGY
End: 2023-09-01

## 2023-08-31 DIAGNOSIS — B07.8 OTHER VIRAL WARTS: ICD-10-CM

## 2023-08-31 DIAGNOSIS — L30.8 OTHER SPECIFIED DERMATITIS: ICD-10-CM

## 2023-08-31 PROCEDURE — 17110 DESTRUCT B9 LESION 1-14: CPT

## 2023-08-31 PROCEDURE — 99212 OFFICE O/P EST SF 10 MIN: CPT | Mod: 25

## 2023-08-31 PROCEDURE — OTHER LIQUID NITROGEN: OTHER

## 2023-08-31 PROCEDURE — OTHER TREATMENT REGIMEN: OTHER

## 2023-08-31 PROCEDURE — OTHER PRESCRIPTION MEDICATION MANAGEMENT: OTHER

## 2023-08-31 PROCEDURE — OTHER MIPS QUALITY: OTHER

## 2023-08-31 ASSESSMENT — LOCATION DETAILED DESCRIPTION DERM
LOCATION DETAILED: LEFT DISTAL DORSAL MIDDLE FINGER
LOCATION DETAILED: PERIUNGUAL SKIN LEFT THUMB

## 2023-08-31 ASSESSMENT — LOCATION SIMPLE DESCRIPTION DERM
LOCATION SIMPLE: LEFT THUMB
LOCATION SIMPLE: LEFT MIDDLE FINGER

## 2023-08-31 ASSESSMENT — LOCATION ZONE DERM: LOCATION ZONE: FINGER

## 2023-08-31 NOTE — PROCEDURE: TREATMENT REGIMEN
Detail Level: Detailed
Plan: Recommended OTC wart stick nightly as tolerated \\nPlain vaseline in the morning as emollient

## 2023-08-31 NOTE — PROCEDURE: LIQUID NITROGEN
Spray Paint Text: The liquid nitrogen was applied to the skin utilizing a spray paint frosting technique.
Post-Care Instructions: I reviewed with the patient in detail post-care instructions. Patient is to wear sunprotection, and avoid picking at any of the treated lesions. Pt may apply Vaseline to crusted or scabbing areas.
Spray Paint Technique: No
Show Applicator Variable?: Yes
Medical Necessity Clause: This procedure was medically necessary because the lesions that were treated were:
Medical Necessity Information: It is in your best interest to select a reason for this procedure from the list below. All of these items fulfill various CMS LCD requirements except the new and changing color options.
Detail Level: Detailed
Consent: The patient's consent was obtained including but not limited to risks of crusting, scabbing, blistering, scarring, darker or lighter pigmentary change, recurrence, incomplete removal and infection.

## 2023-09-27 ENCOUNTER — OFFICE VISIT (OUTPATIENT)
Dept: URBAN - NONMETROPOLITAN AREA CLINIC 13 | Facility: CLINIC | Age: 53
End: 2023-09-27
Payer: COMMERCIAL

## 2023-09-27 VITALS
WEIGHT: 223.4 LBS | HEART RATE: 89 BPM | BODY MASS INDEX: 38.14 KG/M2 | SYSTOLIC BLOOD PRESSURE: 104 MMHG | DIASTOLIC BLOOD PRESSURE: 67 MMHG | HEIGHT: 64 IN

## 2023-09-27 DIAGNOSIS — K22.70 BARRETT'S ESOPHAGUS WITHOUT DYSPLASIA: ICD-10-CM

## 2023-09-27 DIAGNOSIS — Z12.11 COLON CANCER SCREENING: ICD-10-CM

## 2023-09-27 DIAGNOSIS — R19.4 BOWEL HABIT CHANGES: ICD-10-CM

## 2023-09-27 DIAGNOSIS — K21.9 GERD WITHOUT ESOPHAGITIS: ICD-10-CM

## 2023-09-27 DIAGNOSIS — Z86.010 PERSONAL HISTORY OF COLONIC POLYPS: ICD-10-CM

## 2023-09-27 PROCEDURE — 99213 OFFICE O/P EST LOW 20 MIN: CPT | Performed by: NURSE PRACTITIONER

## 2023-09-27 RX ORDER — LISDEXAMFETAMINE DIMESYLATE 30 MG/1
1 CAPSULE IN THE MORNING CAPSULE ORAL ONCE A DAY
Status: ON HOLD | COMMUNITY

## 2023-09-27 RX ORDER — COLESTIPOL HYDROCHLORIDE 1 G/1
1 TABLET 30MINS PRIOR TO A MEAL TABLET, FILM COATED ORAL TWICE A DAY
Qty: 60 TABLET | Refills: 11 | Status: ACTIVE | COMMUNITY
Start: 2023-06-27

## 2023-09-27 RX ORDER — PANTOPRAZOLE SODIUM 40 MG/1
1 TABLET TABLET, DELAYED RELEASE ORAL TWICE A DAY
Qty: 180 TABLET | Refills: 3 | OUTPATIENT

## 2023-09-27 RX ORDER — SUCRALFATE 1 G/1
1 TABLET DISSOLVED IN A TABLESPOON OF WATER ON AN EMPTY STOMACH TABLET ORAL TWICE A DAY
Qty: 60 TABLET | Refills: 11 | Status: ACTIVE | COMMUNITY

## 2023-09-27 RX ORDER — CYCLOBENZAPRINE HYDROCHLORIDE 5 MG/1
1 TABLET AT BEDTIME AS NEEDED TABLET, FILM COATED ORAL ONCE A DAY
Status: ACTIVE | COMMUNITY

## 2023-09-27 RX ORDER — METFORMIN HYDROCHLORIDE 500 MG/1
1 TABLET WITH A MEAL TABLET, FILM COATED ORAL ONCE A DAY
Status: ACTIVE | COMMUNITY

## 2023-09-27 RX ORDER — COLESTIPOL HYDROCHLORIDE 1 G/1
1 TABLET 30MINS PRIOR TO A MEAL TABLET, FILM COATED ORAL TWICE A DAY
Qty: 60 TABLET | Refills: 11 | OUTPATIENT

## 2023-09-27 RX ORDER — VILAZODONE HYDROCHLORIDE 40 MG/1
1 TABLET WITH FOOD TABLET ORAL ONCE A DAY
Status: DISCONTINUED | COMMUNITY

## 2023-09-27 RX ORDER — BUPROPION HYDROCHLORIDE 300 MG/1
1 TABLET IN THE MORNING TABLET, EXTENDED RELEASE ORAL ONCE A DAY
Status: ACTIVE | COMMUNITY

## 2023-09-27 RX ORDER — FLUTICASONE PROPIONATE 50 UG/1
SPRAY, METERED NASAL
Qty: 0 | Refills: 0 | Status: ACTIVE | COMMUNITY
Start: 1900-01-01

## 2023-09-27 RX ORDER — SUCRALFATE 1 G/1
1 TABLET DISSOLVED IN A TABLESPOON OF WATER ON AN EMPTY STOMACH TABLET ORAL TWICE A DAY
Qty: 60 TABLET | Refills: 11 | OUTPATIENT

## 2023-09-27 RX ORDER — FEXOFENADINE HYDROCHLORIDE 180 MG/1
TABLET, FILM COATED ORAL
Qty: 0 | Refills: 0 | Status: ACTIVE | COMMUNITY
Start: 1900-01-01

## 2023-09-27 RX ORDER — ONDANSETRON 4 MG/1
1 TABLET ON THE TONGUE AND ALLOW TO DISSOLVE TABLET, ORALLY DISINTEGRATING ORAL
Qty: 30 | Refills: 3 | OUTPATIENT

## 2023-09-27 RX ORDER — FAMOTIDINE 40 MG/1
1 TABLET AT BEDTIME TABLET, FILM COATED ORAL ONCE A DAY
Qty: 90 TABLET | Refills: 3 | Status: ACTIVE | COMMUNITY

## 2023-09-27 RX ORDER — ONDANSETRON 4 MG/1
1 TABLET ON THE TONGUE AND ALLOW TO DISSOLVE TABLET, ORALLY DISINTEGRATING ORAL
Qty: 30 | Refills: 3 | Status: ACTIVE | COMMUNITY
Start: 2023-06-27

## 2023-09-27 RX ORDER — FAMOTIDINE 40 MG/1
1 TABLET AT BEDTIME TABLET, FILM COATED ORAL ONCE A DAY
Qty: 90 TABLET | Refills: 3 | OUTPATIENT

## 2023-09-27 RX ORDER — PANTOPRAZOLE SODIUM 40 MG/1
1 TABLET TABLET, DELAYED RELEASE ORAL TWICE A DAY
Qty: 180 TABLET | Refills: 3 | Status: ACTIVE | COMMUNITY

## 2023-09-27 RX ORDER — HYOSCYAMINE SULFATE 0.12 MG/1
1 TABLET AS NEEDED TABLET ORAL
Qty: 90 | Refills: 3

## 2023-09-27 RX ORDER — CETIRIZINE HYDROCHLORIDE 10 MG/1
TABLET, FILM COATED ORAL
Qty: 0 | Refills: 0 | Status: ACTIVE | COMMUNITY
Start: 1900-01-01

## 2023-09-27 RX ORDER — HYOSCYAMINE SULFATE 0.12 MG/1
1 TABLET AS NEEDED TABLET ORAL
Qty: 90 | Refills: 3 | Status: ACTIVE | COMMUNITY

## 2023-09-27 RX ORDER — CLONIDINE HYDROCHLORIDE 0.1 MG/1
1 TABLET AT BEDTIME TABLET, EXTENDED RELEASE ORAL ONCE A DAY
Status: ACTIVE | COMMUNITY

## 2023-09-27 NOTE — HPI-TODAY'S VISIT:
9/27/2023 Ms. Holguin -Deniz is here for f/u of epigastric pain and reflux.  Her EGD showed possible Barretts- path negative, gastritis and benign polyps in August 2022. She was given protonxi 40mg BID, carafate, and levsin. This improved and then returned when she came off everything but the protonix daily. She started having a return of epigastric pain and reflux in June. She restarted the carafate and levsin with improvement. Today, she has been able to wean back to once a day protonix. She will take the carafate and pepcid prn. She continues to wake at 2-4 am with a racing heart. She did a holter for a month and her heart rate never changed. CS

## 2023-09-27 NOTE — HPI-OTHER HISTORIES
5/6/2022 Ms. Chelsie Ash is a 51 year old female here for epigastric pain and reflux. She was last seen in 2018 for hemorrhoids. She is a previous patient of Dr Castillo. Her last EGD was 10/2016 for painful swallowing. Her EGD was normal except for gastritis and benign polyps. She has been on nexium 20mg daily since she was pregnant many years ago. Over this last year, she has had a lot of stress. She has been having more epigastric pain and discomfort. She is taking maalox most days. She is concerned about esophageal damage and barretts. She has failed omeprazole in the past. Her last colonoscopy was 2015 with Dr Castillo with two TA polyps. She is due for colonoscopy. CS  8/23/2022 EGD: 0.5cm salmon colored mucousa, gastritis, gastric polyps. Path negative for Barretts Colonoscopy: 8mm TA polyp.  10/27/2022 Ms. Chelsie Ash is here for epigastric pain and reflux.  Her EGD showed possible Barretts- path negative, gastritis and benign polyps. She was given protonxi 40mg BID, carafate, and levsin. She called after her EGD and was still having a lot of pain, burning, and nausea. She could not get in. She saw her GYN and started on Vyvance and her hormones were stopped. This has really helped. She has been able to come off the carafate, levsin, and drop the protonix to once daily. Her stools are now a little loose.  CS  3/15/2023 Ms. Chelsie Ash is here for epigastric pain and reflux.  Her EGD showed possible Barretts- path negative, gastritis and benign polyps. She was given protonxi 40mg BID, carafate, and levsin. She called after her EGD and was still having a lot of pain, burning, and nausea. She could not get in. She saw her GYN and started on Vyvance and her hormones were stopped. This has really helped. She was able to come off the carafate, levsin, and drop the protonix to once daily.. This was ok until the last few months. Her reflux has returned. She is back to taking maalox. If she misses one dose of protonix she is miserable and maalox does not help. She has been under a lot of stress and she feels this is contributing to her symptoms. CS  6/27/2023 Ms. Leee -Deniz is here for epigastric pain and reflux.  Her EGD showed possible Barretts- path negative, gastritis and benign polyps in August. She was given protonxi 40mg BID, carafate, and levsin. This improved and then returned when she came off everything but the protonix daily. She restarted a week ago with improvement. She is seeing cardiology in August. She is preparing for a trip to [place]. She is also complaining of urgent loose stool after eating out. She had her GB out in 2018. Her bowels are normal otherwise. CS

## 2023-12-16 ENCOUNTER — WEB ENCOUNTER (OUTPATIENT)
Dept: URBAN - NONMETROPOLITAN AREA CLINIC 13 | Facility: CLINIC | Age: 53
End: 2023-12-16

## 2023-12-16 RX ORDER — FAMOTIDINE 40 MG/1
1 TABLET AT BEDTIME TABLET, FILM COATED ORAL ONCE A DAY
Qty: 90 TABLET | Refills: 3

## 2023-12-16 RX ORDER — HYOSCYAMINE SULFATE 0.12 MG/1
1 TABLET AS NEEDED TABLET ORAL
Qty: 90 | Refills: 3
End: 2024-04-16

## 2023-12-16 RX ORDER — SUCRALFATE 1 G/1
1 TABLET DISSOLVED IN A TABLESPOON OF WATER ON AN EMPTY STOMACH TABLET ORAL TWICE A DAY
Qty: 60 TABLET | Refills: 11
End: 2024-12-12

## 2023-12-16 RX ORDER — PANTOPRAZOLE SODIUM 40 MG/1
1 TABLET TABLET, DELAYED RELEASE ORAL TWICE A DAY
Qty: 180 TABLET | Refills: 3

## 2023-12-16 RX ORDER — COLESTIPOL HYDROCHLORIDE 1 G/1
1 TABLET 30MINS PRIOR TO A MEAL TABLET, FILM COATED ORAL TWICE A DAY
Qty: 60 TABLET | Refills: 11

## 2024-01-04 ENCOUNTER — OFFICE VISIT (OUTPATIENT)
Dept: URBAN - NONMETROPOLITAN AREA CLINIC 13 | Facility: CLINIC | Age: 54
End: 2024-01-04
Payer: COMMERCIAL

## 2024-01-04 VITALS
HEIGHT: 64 IN | SYSTOLIC BLOOD PRESSURE: 134 MMHG | DIASTOLIC BLOOD PRESSURE: 82 MMHG | WEIGHT: 214 LBS | BODY MASS INDEX: 36.54 KG/M2 | HEART RATE: 99 BPM

## 2024-01-04 DIAGNOSIS — K21.9 GERD WITHOUT ESOPHAGITIS: ICD-10-CM

## 2024-01-04 DIAGNOSIS — Z86.010 PERSONAL HISTORY OF COLONIC POLYPS: ICD-10-CM

## 2024-01-04 DIAGNOSIS — Z12.11 COLON CANCER SCREENING: ICD-10-CM

## 2024-01-04 DIAGNOSIS — K22.70 BARRETT'S ESOPHAGUS WITHOUT DYSPLASIA: ICD-10-CM

## 2024-01-04 DIAGNOSIS — R19.4 BOWEL HABIT CHANGES: ICD-10-CM

## 2024-01-04 PROCEDURE — 99213 OFFICE O/P EST LOW 20 MIN: CPT | Performed by: NURSE PRACTITIONER

## 2024-01-04 RX ORDER — PANTOPRAZOLE SODIUM 40 MG/1
1 TABLET TABLET, DELAYED RELEASE ORAL TWICE A DAY
Qty: 180 TABLET | Refills: 3 | Status: ACTIVE | COMMUNITY

## 2024-01-04 RX ORDER — COLESTIPOL HYDROCHLORIDE 1 G/1
1 TABLET 30MINS PRIOR TO A MEAL TABLET, FILM COATED ORAL TWICE A DAY
Qty: 60 TABLET | Refills: 11 | OUTPATIENT

## 2024-01-04 RX ORDER — FLUTICASONE PROPIONATE 50 UG/1
SPRAY, METERED NASAL
Qty: 0 | Refills: 0 | Status: ACTIVE | COMMUNITY
Start: 1900-01-01

## 2024-01-04 RX ORDER — CETIRIZINE HYDROCHLORIDE 10 MG/1
TABLET, FILM COATED ORAL
Qty: 0 | Refills: 0 | Status: ACTIVE | COMMUNITY
Start: 1900-01-01

## 2024-01-04 RX ORDER — SUCRALFATE 1 G/1
1 TABLET DISSOLVED IN A TABLESPOON OF WATER ON AN EMPTY STOMACH TABLET ORAL TWICE A DAY
Qty: 60 TABLET | Refills: 11 | Status: ACTIVE | COMMUNITY
End: 2024-12-12

## 2024-01-04 RX ORDER — CYCLOBENZAPRINE HYDROCHLORIDE 5 MG/1
1 TABLET AT BEDTIME AS NEEDED TABLET, FILM COATED ORAL ONCE A DAY
Status: ACTIVE | COMMUNITY

## 2024-01-04 RX ORDER — PANTOPRAZOLE SODIUM 40 MG/1
1 TABLET TABLET, DELAYED RELEASE ORAL TWICE A DAY
Qty: 180 TABLET | Refills: 3 | OUTPATIENT

## 2024-01-04 RX ORDER — ONDANSETRON 4 MG/1
1 TABLET ON THE TONGUE AND ALLOW TO DISSOLVE TABLET, ORALLY DISINTEGRATING ORAL
Qty: 30 | Refills: 3 | Status: ACTIVE | COMMUNITY

## 2024-01-04 RX ORDER — HYOSCYAMINE SULFATE 0.12 MG/1
1 TABLET AS NEEDED TABLET ORAL
Qty: 90 | Refills: 3

## 2024-01-04 RX ORDER — FAMOTIDINE 40 MG/1
1 TABLET AT BEDTIME TABLET, FILM COATED ORAL ONCE A DAY
Qty: 90 TABLET | Refills: 3 | OUTPATIENT

## 2024-01-04 RX ORDER — BUPROPION HYDROCHLORIDE 300 MG/1
1 TABLET IN THE MORNING TABLET, EXTENDED RELEASE ORAL ONCE A DAY
Status: ACTIVE | COMMUNITY

## 2024-01-04 RX ORDER — AMITRIPTYLINE HYDROCHLORIDE 10 MG/1
1 TABLET AT BEDTIME TABLET, FILM COATED ORAL ONCE A DAY
Qty: 90 TABLET | Refills: 3 | OUTPATIENT
Start: 2024-01-04

## 2024-01-04 RX ORDER — CLONIDINE HYDROCHLORIDE 0.1 MG/1
1 TABLET AT BEDTIME TABLET, EXTENDED RELEASE ORAL ONCE A DAY
Status: ACTIVE | COMMUNITY

## 2024-01-04 RX ORDER — LISDEXAMFETAMINE DIMESYLATE 30 MG/1
1 CAPSULE IN THE MORNING CAPSULE ORAL ONCE A DAY
Status: ON HOLD | COMMUNITY

## 2024-01-04 RX ORDER — FEXOFENADINE HYDROCHLORIDE 180 MG/1
TABLET, FILM COATED ORAL
Qty: 0 | Refills: 0 | Status: ACTIVE | COMMUNITY
Start: 1900-01-01

## 2024-01-04 RX ORDER — SUCRALFATE 1 G/1
1 TABLET DISSOLVED IN A TABLESPOON OF WATER ON AN EMPTY STOMACH TABLET ORAL TWICE A DAY
Qty: 60 TABLET | Refills: 11 | OUTPATIENT

## 2024-01-04 RX ORDER — FAMOTIDINE 40 MG/1
1 TABLET AT BEDTIME TABLET, FILM COATED ORAL ONCE A DAY
Qty: 90 TABLET | Refills: 3 | Status: ACTIVE | COMMUNITY

## 2024-01-04 RX ORDER — METFORMIN HYDROCHLORIDE 500 MG/1
1 TABLET WITH A MEAL TABLET, FILM COATED ORAL ONCE A DAY
Status: ACTIVE | COMMUNITY

## 2024-01-04 RX ORDER — HYOSCYAMINE SULFATE 0.12 MG/1
1 TABLET AS NEEDED TABLET ORAL
Qty: 90 | Refills: 3 | Status: ACTIVE | COMMUNITY
End: 2024-04-16

## 2024-01-04 RX ORDER — ONDANSETRON 4 MG/1
1 TABLET ON THE TONGUE AND ALLOW TO DISSOLVE TABLET, ORALLY DISINTEGRATING ORAL
Qty: 30 | Refills: 3 | OUTPATIENT

## 2024-01-04 RX ORDER — COLESTIPOL HYDROCHLORIDE 1 G/1
1 TABLET 30MINS PRIOR TO A MEAL TABLET, FILM COATED ORAL TWICE A DAY
Qty: 60 TABLET | Refills: 11 | Status: ACTIVE | COMMUNITY

## 2024-01-04 NOTE — HPI-TODAY'S VISIT:
1/4/2024 Ms. Holguin -Deniz is here for f/u of epigastric pain and reflux.  Her EGD showed possible Barretts- path negative, gastritis and benign polyps in August 2022. She has been taking protonix 40mg in the morning, pepcid and carafate prn, and levsin at bedtime. She has not had any further episodes of heart racing. She has noticed that stress contributes to her reflux. The pepcid and carafate are helping. She is not sure if her welbutrin is working. She is on trazadone but rarely takes it. CS

## 2024-01-04 NOTE — HPI-OTHER HISTORIES
5/6/2022 Ms. Chelsie Ash is a 51 year old female here for epigastric pain and reflux. She was last seen in 2018 for hemorrhoids. She is a previous patient of Dr Castillo. Her last EGD was 10/2016 for painful swallowing. Her EGD was normal except for gastritis and benign polyps. She has been on nexium 20mg daily since she was pregnant many years ago. Over this last year, she has had a lot of stress. She has been having more epigastric pain and discomfort. She is taking maalox most days. She is concerned about esophageal damage and barretts. She has failed omeprazole in the past. Her last colonoscopy was 2015 with Dr Castillo with two TA polyps. She is due for colonoscopy. CS  8/23/2022 EGD: 0.5cm salmon colored mucousa, gastritis, gastric polyps. Path negative for Barretts Colonoscopy: 8mm TA polyp.  10/27/2022 Ms. Chelsie Ash is here for epigastric pain and reflux.  Her EGD showed possible Barretts- path negative, gastritis and benign polyps. She was given protonxi 40mg BID, carafate, and levsin. She called after her EGD and was still having a lot of pain, burning, and nausea. She could not get in. She saw her GYN and started on Vyvance and her hormones were stopped. This has really helped. She has been able to come off the carafate, levsin, and drop the protonix to once daily. Her stools are now a little loose.  CS  3/15/2023 Ms. Chelsie Ash is here for epigastric pain and reflux.  Her EGD showed possible Barretts- path negative, gastritis and benign polyps. She was given protonxi 40mg BID, carafate, and levsin. She called after her EGD and was still having a lot of pain, burning, and nausea. She could not get in. She saw her GYN and started on Vyvance and her hormones were stopped. This has really helped. She was able to come off the carafate, levsin, and drop the protonix to once daily.. This was ok until the last few months. Her reflux has returned. She is back to taking maalox. If she misses one dose of protonix she is miserable and maalox does not help. She has been under a lot of stress and she feels this is contributing to her symptoms. CS  6/27/2023 Ms. Chelsie Ash is here for epigastric pain and reflux.  Her EGD showed possible Barretts- path negative, gastritis and benign polyps in August. She was given protonxi 40mg BID, carafate, and levsin. This improved and then returned when she came off everything but the protonix daily. She restarted a week ago with improvement. She is seeing cardiology in August. She is preparing for a trip to [place]. She is also complaining of urgent loose stool after eating out. She had her GB out in 2018. Her bowels are normal otherwise. CS 9/27/2023 Ms. Chelsie Ash is here for f/u of epigastric pain and reflux. Her EGD showed possible Barretts- path negative, gastritis and benign polyps in August 2022. She was given protonxi 40mg BID, carafate, and levsin. This improved and then returned when she came off everything but the protonix daily. She started having a return of epigastric pain and reflux in June. She restarted the carafate and levsin with improvement. Today, she has been able to wean back to once a day protonix. She will take the carafate and pepcid prn. She continues to wake at 2-4 am with a racing heart. She did a holter for a month and her heart rate never changed. CS

## 2024-01-21 NOTE — PROCEDURE: MEDICATION COUNSELING
The patient is a 55y Female complaining of hypertension. Rhofade Counseling: Rhofade is a topical medication which can decrease superficial blood flow where applied. Side effects are uncommon and include stinging, redness and allergic reactions.

## 2024-02-14 ENCOUNTER — OV EP (OUTPATIENT)
Dept: URBAN - NONMETROPOLITAN AREA CLINIC 13 | Facility: CLINIC | Age: 54
End: 2024-02-14
Payer: COMMERCIAL

## 2024-02-14 VITALS
DIASTOLIC BLOOD PRESSURE: 84 MMHG | HEART RATE: 92 BPM | SYSTOLIC BLOOD PRESSURE: 125 MMHG | WEIGHT: 212 LBS | BODY MASS INDEX: 36.19 KG/M2 | HEIGHT: 64 IN

## 2024-02-14 DIAGNOSIS — Z86.010 PERSONAL HISTORY OF COLONIC POLYPS: ICD-10-CM

## 2024-02-14 DIAGNOSIS — R19.4 BOWEL HABIT CHANGES: ICD-10-CM

## 2024-02-14 DIAGNOSIS — K21.9 GERD WITHOUT ESOPHAGITIS: ICD-10-CM

## 2024-02-14 DIAGNOSIS — Z12.11 COLON CANCER SCREENING: ICD-10-CM

## 2024-02-14 DIAGNOSIS — K22.70 BARRETT'S ESOPHAGUS WITHOUT DYSPLASIA: ICD-10-CM

## 2024-02-14 PROBLEM — 266435005: Status: ACTIVE | Noted: 2022-05-05

## 2024-02-14 PROCEDURE — 99213 OFFICE O/P EST LOW 20 MIN: CPT | Performed by: NURSE PRACTITIONER

## 2024-02-14 RX ORDER — FAMOTIDINE 40 MG/1
1 TABLET AT BEDTIME TABLET, FILM COATED ORAL ONCE A DAY
Qty: 90 TABLET | Refills: 3 | Status: ACTIVE | COMMUNITY

## 2024-02-14 RX ORDER — ONDANSETRON 4 MG/1
1 TABLET ON THE TONGUE AND ALLOW TO DISSOLVE TABLET, ORALLY DISINTEGRATING ORAL
Qty: 30 | Refills: 3 | OUTPATIENT

## 2024-02-14 RX ORDER — HYOSCYAMINE SULFATE 0.12 MG/1
1 TABLET AS NEEDED TABLET ORAL
Qty: 90 | Refills: 3

## 2024-02-14 RX ORDER — HYOSCYAMINE SULFATE 0.12 MG/1
1 TABLET AS NEEDED TABLET ORAL
Qty: 90 | Refills: 3 | Status: ACTIVE | COMMUNITY

## 2024-02-14 RX ORDER — FEXOFENADINE HYDROCHLORIDE 180 MG/1
TABLET, FILM COATED ORAL
Qty: 0 | Refills: 0 | Status: ACTIVE | COMMUNITY
Start: 1900-01-01

## 2024-02-14 RX ORDER — FAMOTIDINE 40 MG/1
1 TABLET AT BEDTIME TABLET, FILM COATED ORAL ONCE A DAY
Qty: 90 TABLET | Refills: 3 | OUTPATIENT

## 2024-02-14 RX ORDER — BUPROPION HYDROCHLORIDE 300 MG/1
1 TABLET IN THE MORNING TABLET, EXTENDED RELEASE ORAL ONCE A DAY
Status: ACTIVE | COMMUNITY

## 2024-02-14 RX ORDER — AMITRIPTYLINE HYDROCHLORIDE 10 MG/1
1 TABLET AT BEDTIME TABLET, FILM COATED ORAL ONCE A DAY
Qty: 90 TABLET | Refills: 3 | Status: ACTIVE | COMMUNITY
Start: 2024-01-04

## 2024-02-14 RX ORDER — PANTOPRAZOLE SODIUM 40 MG/1
1 TABLET TABLET, DELAYED RELEASE ORAL TWICE A DAY
Qty: 180 TABLET | Refills: 3 | OUTPATIENT

## 2024-02-14 RX ORDER — COLESTIPOL HYDROCHLORIDE 1 G/1
1 TABLET 30MINS PRIOR TO A MEAL TABLET, FILM COATED ORAL TWICE A DAY
Qty: 60 TABLET | Refills: 11 | Status: ACTIVE | COMMUNITY

## 2024-02-14 RX ORDER — CLONIDINE HYDROCHLORIDE 0.1 MG/1
1 TABLET AT BEDTIME TABLET, EXTENDED RELEASE ORAL ONCE A DAY
Status: ACTIVE | COMMUNITY

## 2024-02-14 RX ORDER — PANTOPRAZOLE SODIUM 40 MG/1
1 TABLET TABLET, DELAYED RELEASE ORAL TWICE A DAY
Qty: 180 TABLET | Refills: 3 | Status: ACTIVE | COMMUNITY

## 2024-02-14 RX ORDER — CYCLOBENZAPRINE HYDROCHLORIDE 5 MG/1
1 TABLET AT BEDTIME AS NEEDED TABLET, FILM COATED ORAL ONCE A DAY
Status: ACTIVE | COMMUNITY

## 2024-02-14 RX ORDER — METFORMIN HYDROCHLORIDE 500 MG/1
1 TABLET WITH A MEAL TABLET, FILM COATED ORAL ONCE A DAY
Status: ACTIVE | COMMUNITY

## 2024-02-14 RX ORDER — LISDEXAMFETAMINE DIMESYLATE 30 MG/1
1 CAPSULE IN THE MORNING CAPSULE ORAL ONCE A DAY
Status: DISCONTINUED | COMMUNITY

## 2024-02-14 RX ORDER — AMITRIPTYLINE HYDROCHLORIDE 25 MG/1
1 TABLET AT BEDTIME TABLET, FILM COATED ORAL ONCE A DAY
Qty: 90 TABLET | Refills: 3 | OUTPATIENT

## 2024-02-14 RX ORDER — ONDANSETRON 4 MG/1
1 TABLET ON THE TONGUE AND ALLOW TO DISSOLVE TABLET, ORALLY DISINTEGRATING ORAL
Qty: 30 | Refills: 3 | Status: ACTIVE | COMMUNITY

## 2024-02-14 RX ORDER — SUCRALFATE 1 G/1
1 TABLET DISSOLVED IN A TABLESPOON OF WATER ON AN EMPTY STOMACH TABLET ORAL TWICE A DAY
Qty: 60 TABLET | Refills: 11 | Status: ACTIVE | COMMUNITY

## 2024-02-14 RX ORDER — CETIRIZINE HYDROCHLORIDE 10 MG/1
TABLET, FILM COATED ORAL
Qty: 0 | Refills: 0 | Status: ACTIVE | COMMUNITY
Start: 1900-01-01

## 2024-02-14 RX ORDER — FLUTICASONE PROPIONATE 50 UG/1
SPRAY, METERED NASAL
Qty: 0 | Refills: 0 | Status: ACTIVE | COMMUNITY
Start: 1900-01-01

## 2024-02-14 RX ORDER — COLESTIPOL HYDROCHLORIDE 1 G/1
1 TABLET 30MINS PRIOR TO A MEAL TABLET, FILM COATED ORAL TWICE A DAY
Qty: 60 TABLET | Refills: 11 | OUTPATIENT

## 2024-02-14 RX ORDER — SUCRALFATE 1 G/1
1 TABLET DISSOLVED IN A TABLESPOON OF WATER ON AN EMPTY STOMACH TABLET ORAL TWICE A DAY
Qty: 60 TABLET | Refills: 11 | OUTPATIENT

## 2024-02-14 NOTE — HPI-TODAY'S VISIT:
2/14/2024 Ms. Holguin -Deniz is here for f/u of epigastric pain and reflux.  Her EGD showed possible Barretts- path negative, gastritis and benign polyps in August 2022. Currently her reflux is doing better with protonix 40mg in the morning, carafate prn, and levsin at bedtime. She will have flares and take a second protonix. The pepcid is not covered by insurance. Since adding AMT, she has noticed her nausea has been better overall but she still had a bad episode after a stressful event. CS

## 2024-02-14 NOTE — HPI-OTHER HISTORIES
5/6/2022 Ms. Chelsie Ash is a 51 year old female here for epigastric pain and reflux. She was last seen in 2018 for hemorrhoids. She is a previous patient of Dr Castillo. Her last EGD was 10/2016 for painful swallowing. Her EGD was normal except for gastritis and benign polyps. She has been on nexium 20mg daily since she was pregnant many years ago. Over this last year, she has had a lot of stress. She has been having more epigastric pain and discomfort. She is taking maalox most days. She is concerned about esophageal damage and barretts. She has failed omeprazole in the past. Her last colonoscopy was 2015 with Dr Castillo with two TA polyps. She is due for colonoscopy. CS  8/23/2022 EGD: 0.5cm salmon colored mucousa, gastritis, gastric polyps. Path negative for Barretts Colonoscopy: 8mm TA polyp.  10/27/2022 Ms. Chelsie Ash is here for epigastric pain and reflux.  Her EGD showed possible Barretts- path negative, gastritis and benign polyps. She was given protonxi 40mg BID, carafate, and levsin. She called after her EGD and was still having a lot of pain, burning, and nausea. She could not get in. She saw her GYN and started on Vyvance and her hormones were stopped. This has really helped. She has been able to come off the carafate, levsin, and drop the protonix to once daily. Her stools are now a little loose.  CS  3/15/2023 Ms. Chelsie Ash is here for epigastric pain and reflux.  Her EGD showed possible Barretts- path negative, gastritis and benign polyps. She was given protonxi 40mg BID, carafate, and levsin. She called after her EGD and was still having a lot of pain, burning, and nausea. She could not get in. She saw her GYN and started on Vyvance and her hormones were stopped. This has really helped. She was able to come off the carafate, levsin, and drop the protonix to once daily.. This was ok until the last few months. Her reflux has returned. She is back to taking maalox. If she misses one dose of protonix she is miserable and maalox does not help. She has been under a lot of stress and she feels this is contributing to her symptoms. CS  6/27/2023 Ms. Chelsie Ash is here for epigastric pain and reflux.  Her EGD showed possible Barretts- path negative, gastritis and benign polyps in August. She was given protonxi 40mg BID, carafate, and levsin. This improved and then returned when she came off everything but the protonix daily. She restarted a week ago with improvement. She is seeing cardiology in August. She is preparing for a trip to [place]. She is also complaining of urgent loose stool after eating out. She had her GB out in 2018. Her bowels are normal otherwise. CS 9/27/2023 Ms. Chelsie Ash is here for f/u of epigastric pain and reflux. Her EGD showed possible Barretts- path negative, gastritis and benign polyps in August 2022. She was given protonxi 40mg BID, carafate, and levsin. This improved and then returned when she came off everything but the protonix daily. She started having a return of epigastric pain and reflux in June. She restarted the carafate and levsin with improvement. Today, she has been able to wean back to once a day protonix. She will take the carafate and pepcid prn. She continues to wake at 2-4 am with a racing heart. She did a holter for a month and her heart rate never changed. CS  1/4/2024 Ms. Chelsie Ash is here for f/u of epigastric pain and reflux. Her EGD showed possible Barretts- path negative, gastritis and benign polyps in August 2022. She has been taking protonix 40mg in the morning, pepcid and carafate prn, and levsin at bedtime. She has not had any further episodes of heart racing. She has noticed that stress contributes to her reflux. The pepcid and carafate are helping. She is not sure if her welbutrin is working. She is on trazadone but rarely takes it. CS

## 2024-05-14 ENCOUNTER — OFFICE VISIT (OUTPATIENT)
Dept: URBAN - NONMETROPOLITAN AREA CLINIC 13 | Facility: CLINIC | Age: 54
End: 2024-05-14

## 2024-05-29 ENCOUNTER — OFFICE VISIT (OUTPATIENT)
Dept: URBAN - NONMETROPOLITAN AREA CLINIC 13 | Facility: CLINIC | Age: 54
End: 2024-05-29

## 2024-07-30 ENCOUNTER — OFFICE VISIT (OUTPATIENT)
Dept: URBAN - NONMETROPOLITAN AREA CLINIC 13 | Facility: CLINIC | Age: 54
End: 2024-07-30
Payer: COMMERCIAL

## 2024-07-30 ENCOUNTER — DASHBOARD ENCOUNTERS (OUTPATIENT)
Age: 54
End: 2024-07-30

## 2024-07-30 VITALS
WEIGHT: 212 LBS | HEART RATE: 92 BPM | DIASTOLIC BLOOD PRESSURE: 73 MMHG | HEIGHT: 64 IN | SYSTOLIC BLOOD PRESSURE: 117 MMHG | BODY MASS INDEX: 36.19 KG/M2

## 2024-07-30 DIAGNOSIS — R05.1 ACUTE COUGH: ICD-10-CM

## 2024-07-30 DIAGNOSIS — K21.9 GERD WITHOUT ESOPHAGITIS: ICD-10-CM

## 2024-07-30 DIAGNOSIS — Z12.11 COLON CANCER SCREENING: ICD-10-CM

## 2024-07-30 DIAGNOSIS — K22.70 BARRETT'S ESOPHAGUS WITHOUT DYSPLASIA: ICD-10-CM

## 2024-07-30 DIAGNOSIS — R19.4 BOWEL HABIT CHANGES: ICD-10-CM

## 2024-07-30 DIAGNOSIS — Z86.010 PERSONAL HISTORY OF COLONIC POLYPS: ICD-10-CM

## 2024-07-30 PROCEDURE — 99213 OFFICE O/P EST LOW 20 MIN: CPT | Performed by: NURSE PRACTITIONER

## 2024-07-30 RX ORDER — FAMOTIDINE 40 MG/1
1 TABLET AT BEDTIME TABLET, FILM COATED ORAL ONCE A DAY
Qty: 90 TABLET | Refills: 3 | Status: ACTIVE | COMMUNITY

## 2024-07-30 RX ORDER — AMITRIPTYLINE HYDROCHLORIDE 25 MG/1
1 TABLET AT BEDTIME TABLET, FILM COATED ORAL ONCE A DAY
Qty: 90 TABLET | Refills: 3 | Status: ACTIVE | COMMUNITY

## 2024-07-30 RX ORDER — FEXOFENADINE HYDROCHLORIDE 180 MG/1
TABLET, FILM COATED ORAL
Qty: 0 | Refills: 0 | Status: ACTIVE | COMMUNITY
Start: 1900-01-01

## 2024-07-30 RX ORDER — SUCRALFATE 1 G/1
1 TABLET DISSOLVED IN A TABLESPOON OF WATER ON AN EMPTY STOMACH TABLET ORAL TWICE A DAY
Qty: 60 TABLET | Refills: 11 | Status: ACTIVE | COMMUNITY

## 2024-07-30 RX ORDER — PANTOPRAZOLE SODIUM 40 MG/1
1 TABLET TABLET, DELAYED RELEASE ORAL TWICE A DAY
Qty: 180 TABLET | Refills: 3 | Status: ACTIVE | COMMUNITY

## 2024-07-30 RX ORDER — HYOSCYAMINE SULFATE 0.12 MG/1
1 TABLET AS NEEDED TABLET ORAL
Qty: 90 | Refills: 3 | Status: ACTIVE | COMMUNITY

## 2024-07-30 RX ORDER — SUCRALFATE 1 G/1
1 TABLET DISSOLVED IN A TABLESPOON OF WATER ON AN EMPTY STOMACH TABLET ORAL TWICE A DAY
Qty: 60 TABLET | Refills: 11 | OUTPATIENT

## 2024-07-30 RX ORDER — PANTOPRAZOLE SODIUM 40 MG/1
1 TABLET TABLET, DELAYED RELEASE ORAL ONCE A DAY
Qty: 90 TABLET | Refills: 3 | OUTPATIENT

## 2024-07-30 RX ORDER — CETIRIZINE HYDROCHLORIDE 10 MG/1
TABLET, FILM COATED ORAL
Qty: 0 | Refills: 0 | Status: ACTIVE | COMMUNITY
Start: 1900-01-01

## 2024-07-30 RX ORDER — CYCLOBENZAPRINE HYDROCHLORIDE 5 MG/1
1 TABLET AT BEDTIME AS NEEDED TABLET, FILM COATED ORAL ONCE A DAY
Status: ACTIVE | COMMUNITY

## 2024-07-30 RX ORDER — BUPROPION HYDROCHLORIDE 300 MG/1
1 TABLET IN THE MORNING TABLET, EXTENDED RELEASE ORAL ONCE A DAY
Status: ACTIVE | COMMUNITY

## 2024-07-30 RX ORDER — AMITRIPTYLINE HYDROCHLORIDE 25 MG/1
1 TABLET AT BEDTIME TABLET, FILM COATED ORAL ONCE A DAY
Qty: 90 TABLET | Refills: 3 | OUTPATIENT

## 2024-07-30 RX ORDER — HYOSCYAMINE SULFATE 0.12 MG/1
1 TABLET AS NEEDED TABLET ORAL
Qty: 90 | Refills: 3

## 2024-07-30 RX ORDER — METFORMIN HYDROCHLORIDE 500 MG/1
1 TABLET WITH A MEAL TABLET, FILM COATED ORAL ONCE A DAY
Status: ACTIVE | COMMUNITY

## 2024-07-30 RX ORDER — COLESTIPOL HYDROCHLORIDE 1 G/1
1 TABLET 30MINS PRIOR TO A MEAL TABLET, FILM COATED ORAL TWICE A DAY
Qty: 60 TABLET | Refills: 11 | Status: ACTIVE | COMMUNITY

## 2024-07-30 RX ORDER — ONDANSETRON 4 MG/1
1 TABLET ON THE TONGUE AND ALLOW TO DISSOLVE TABLET, ORALLY DISINTEGRATING ORAL
Qty: 30 | Refills: 3 | OUTPATIENT

## 2024-07-30 RX ORDER — ONDANSETRON 4 MG/1
1 TABLET ON THE TONGUE AND ALLOW TO DISSOLVE TABLET, ORALLY DISINTEGRATING ORAL
Qty: 30 | Refills: 3 | Status: ACTIVE | COMMUNITY

## 2024-07-30 RX ORDER — COLESTIPOL HYDROCHLORIDE 1 G/1
1 TABLET 30MINS PRIOR TO A MEAL TABLET, FILM COATED ORAL TWICE A DAY
Qty: 60 TABLET | Refills: 11 | OUTPATIENT

## 2024-07-30 RX ORDER — FLUTICASONE PROPIONATE 50 UG/1
SPRAY, METERED NASAL
Qty: 0 | Refills: 0 | Status: ACTIVE | COMMUNITY
Start: 1900-01-01

## 2024-07-30 RX ORDER — FAMOTIDINE 40 MG/1
1 TABLET AT BEDTIME TABLET, FILM COATED ORAL ONCE A DAY
Qty: 90 TABLET | Refills: 3 | OUTPATIENT

## 2024-07-30 RX ORDER — CLONIDINE HYDROCHLORIDE 0.1 MG/1
1 TABLET AT BEDTIME TABLET, EXTENDED RELEASE ORAL ONCE A DAY
Status: ACTIVE | COMMUNITY

## 2024-07-30 NOTE — HPI-OTHER HISTORIES
5/6/2022 Ms. Chelsie Ash is a 51 year old female here for epigastric pain and reflux. She was last seen in 2018 for hemorrhoids. She is a previous patient of Dr Castillo. Her last EGD was 10/2016 for painful swallowing. Her EGD was normal except for gastritis and benign polyps. She has been on nexium 20mg daily since she was pregnant many years ago. Over this last year, she has had a lot of stress. She has been having more epigastric pain and discomfort. She is taking maalox most days. She is concerned about esophageal damage and barretts. She has failed omeprazole in the past. Her last colonoscopy was 2015 with Dr Castillo with two TA polyps. She is due for colonoscopy. CS  8/23/2022 EGD: 0.5cm salmon colored mucousa, gastritis, gastric polyps. Path negative for Barretts Colonoscopy: 8mm TA polyp.  10/27/2022 Ms. Chelsie Ash is here for epigastric pain and reflux.  Her EGD showed possible Barretts- path negative, gastritis and benign polyps. She was given protonxi 40mg BID, carafate, and levsin. She called after her EGD and was still having a lot of pain, burning, and nausea. She could not get in. She saw her GYN and started on Vyvance and her hormones were stopped. This has really helped. She has been able to come off the carafate, levsin, and drop the protonix to once daily. Her stools are now a little loose.  CS  3/15/2023 Ms. Chelsie Ash is here for epigastric pain and reflux.  Her EGD showed possible Barretts- path negative, gastritis and benign polyps. She was given protonxi 40mg BID, carafate, and levsin. She called after her EGD and was still having a lot of pain, burning, and nausea. She could not get in. She saw her GYN and started on Vyvance and her hormones were stopped. This has really helped. She was able to come off the carafate, levsin, and drop the protonix to once daily.. This was ok until the last few months. Her reflux has returned. She is back to taking maalox. If she misses one dose of protonix she is miserable and maalox does not help. She has been under a lot of stress and she feels this is contributing to her symptoms. CS  6/27/2023 Ms. Chelsie Ash is here for epigastric pain and reflux.  Her EGD showed possible Barretts- path negative, gastritis and benign polyps in August. She was given protonxi 40mg BID, carafate, and levsin. This improved and then returned when she came off everything but the protonix daily. She restarted a week ago with improvement. She is seeing cardiology in August. She is preparing for a trip to [place]. She is also complaining of urgent loose stool after eating out. She had her GB out in 2018. Her bowels are normal otherwise. CS 9/27/2023 Ms. Chelsie Ash is here for f/u of epigastric pain and reflux. Her EGD showed possible Barretts- path negative, gastritis and benign polyps in August 2022. She was given protonxi 40mg BID, carafate, and levsin. This improved and then returned when she came off everything but the protonix daily. She started having a return of epigastric pain and reflux in June. She restarted the carafate and levsin with improvement. Today, she has been able to wean back to once a day protonix. She will take the carafate and pepcid prn. She continues to wake at 2-4 am with a racing heart. She did a holter for a month and her heart rate never changed. CS  1/4/2024 Ms. Chelsie Ash is here for f/u of epigastric pain and reflux. Her EGD showed possible Barretts- path negative, gastritis and benign polyps in August 2022. She has been taking protonix 40mg in the morning, pepcid and carafate prn, and levsin at bedtime. She has not had any further episodes of heart racing. She has noticed that stress contributes to her reflux. The pepcid and carafate are helping. She is not sure if her welbutrin is working. She is on trazadone but rarely takes it. CS  2/14/2024 Ms. Chelsie Ash is here for f/u of epigastric pain and reflux. Her EGD showed possible Barretts- path negative, gastritis and benign polyps in August 2022. Currently her reflux is doing better with protonix 40mg in the morning, carafate prn, and levsin at bedtime. She will have flares and take a second protonix. The pepcid is not covered by insurance. Since adding AMT, she has noticed her nausea has been better overall but she still had a bad episode after a stressful event. CS

## 2024-07-30 NOTE — HPI-TODAY'S VISIT:
7/30/2024 Ms. Chelsie Mcrae-Deniz is here for f/u of epigastric pain and reflux.  Her EGD showed possible Barretts- path negative, gastritis and benign polyps in August 2022. Currently her reflux is doing better with protonix 40mg in the morning, carafate prn, and levsin prn at bedtime. She will have flares and take a second protonix for a week and it will resovle. She is on AMT 25mg and her PCP add buspar BID. She feels she is in a good place and over all her symptoms are manageable. CS

## 2024-10-23 ENCOUNTER — OFFICE VISIT (OUTPATIENT)
Dept: URBAN - NONMETROPOLITAN AREA CLINIC 13 | Facility: CLINIC | Age: 54
End: 2024-10-23

## 2025-03-27 NOTE — PROCEDURE: MEDICATION COUNSELING
Acute Injury New Patient Visit    CC:   Chief Complaint   Patient presents with    Left Thumb - Pain     Jammed during basketball yesterday  Hx Fx 2017       HPI: Filemon is a 16 y.o.male who presents today with new complaints of acute pain discomfort to the left distal first phalanx.  He is, is still during a basketball yesterday.  He presents here today for further evaluation.  Has a history of known fracture to this hand and thumb in the past.        Review of Systems   GENERAL: Negative for malaise, significant weight loss, fever  MUSCULOSKELETAL: See HPI  NEURO: Negative for numbness / tingling     Past Medical History  Past Medical History:   Diagnosis Date    Allergic contact dermatitis due to plants, except food 05/12/2020    Contact dermatitis due to poison ivy    Body mass index (BMI) pediatric, 5th percentile to less than 85th percentile for age 12/23/2019    BMI (body mass index), pediatric, 5% to less than 85% for age    Body mass index (BMI) pediatric, 85th percentile to less than 95th percentile for age 01/21/2021    BMI (body mass index), pediatric, 85% to less than 95% for age    Body mass index (BMI) pediatric, 85th percentile to less than 95th percentile for age 12/23/2019    BMI (body mass index), pediatric, 85% to less than 95% for age    Edema of left eye, unspecified eyelid 05/12/2020    Swelling of left eyelid    Encounter for routine child health examination without abnormal findings 12/23/2019    Encounter for routine child health examination without abnormal findings    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    Femoral anteversion of both lower extremities    Other specified health status     No pertinent past surgical history    Other specified health status     Known health problems: none    Personal history of anaphylaxis 08/06/2020    History of anaphylaxis    Personal history of diseases of the skin and subcutaneous tissue 05/12/2020    History of skin pruritus     Personal history of other (healed) physical injury and trauma 08/06/2020    History of insect sting    Personal history of other infectious and parasitic diseases 01/24/2018    History of viral infection    Personal history of other infectious and parasitic diseases 12/14/2017    History of molluscum contagiosum    Personal history of other specified conditions 01/24/2018    History of diarrhea    Pneumonia, unspecified organism 11/09/2018    Atypical pneumonia    Unspecified contact dermatitis, unspecified cause 10/07/2020    Systemic contact dermatitis       Medication review  Medication Documentation Review Audit       Reviewed by Cole C Budinsky, MD (Physician) on 03/27/25 at 0855      Medication Order Taking? Sig Documenting Provider Last Dose Status   EPINEPHrine 0.3 mg/0.3 mL injection syringe [id number]  Inject 0.3 mL (0.3 mg) as directed if needed for anaphylaxis. Chikis Hanna MD  Active                    Allergies  Allergies   Allergen Reactions    Bee Venom Protein (Honey Bee) Unknown       Social History  Social History     Socioeconomic History    Marital status: Single     Spouse name: Not on file    Number of children: Not on file    Years of education: Not on file    Highest education level: Not on file   Occupational History    Not on file   Tobacco Use    Smoking status: Never    Smokeless tobacco: Never   Vaping Use    Vaping status: Never Used   Substance and Sexual Activity    Alcohol use: Never    Drug use: Never    Sexual activity: Never   Other Topics Concern    Not on file   Social History Narrative    Not on file     Social Drivers of Health     Financial Resource Strain: Low Risk  (5/11/2022)    Received from [facility]    Overall Financial Resource Strain (CARDIA)     Difficulty of Paying Living Expenses: Not hard at all   Food Insecurity: No Food Insecurity (5/11/2022)    Received from [email or web address]    Hunger Vital Sign     Worried About Running Out of Food in the Last Year: Never true     Ran Out of Food in the Last Year: Never true   Transportation Needs: Not on file   Physical Activity: Not on file   Stress: Not on file   Intimate Partner Violence: Not on file   Housing Stability: Not on file       Surgical History  History reviewed. No pertinent surgical history.    Physical Exam:  GENERAL:  Patient is awake, alert, and oriented to person place and time.  Patient appears well nourished and well kept.  Affect Calm, Not Acutely Distressed.  HEENT:  Normocephalic, Atraumatic, EOMI  CARDIOVASCULAR:  Hemodynamically stable.  RESPIRATORY:  Normal respirations with unlabored breathing.  NEURO: Gross sensation intact to the upper extremities bilaterally.  Extremity: Left thumb demonstrates skin which is warm pink well-perfused soft tissue swelling fullness and some bruising noted over the distal phalanx.  He has ability to gently flex and extend about the IP joint with mild discomfort there is no obvious subungual hematoma.  There is a chronic nailbed deformity noted.  Good distal cap refill noted throughout he has normal circumduction about the thumb.  No laxity about the MCP.      Diagnostics: X-rays today demonstrate an acute nondisplaced Salter-Rahman type I distal first phalanx fracture        Procedure: None  Procedures    Assessment:   Problem List Items Addressed This Visit    None  Visit Diagnoses       Closed fracture of base of distal phalanx of finger    -  Primary    Relevant Orders    Thumb Spica, Thumbster    Pain of left thumb        Relevant Orders    XR thumb left MIN 2 views             Plan: Discussed the nonoperative nature of this injury with patient and the family at the bedside today.  Offer him a wrist free thumb spica removable fracture brace.  He can come out of this for gentle range of motion recovery as pain allows he will be allowed to participate in track  but no basketball.  This was discussed at length with mom and the patient who acknowledged agreement understanding.  Patient to call or return sooner with any issues.  Will plan on seeing them in our [place] office in approximately 3 weeks for repeat evaluation including x-rays 3 views of the left thumb.  They will utilize relative rest ice Tylenol ibuprofen for pain control.  Orders Placed This Encounter    Thumb Spica, Thumbster    XR thumb left MIN 2 views      At the conclusion of the visit there were no further questions by the patient/family regarding their plan of care.  Patient was instructed to call or return with any issues, questions, or concerns regarding their injury and/or treatment plan described above.     03/27/25 at 5:47 PM - Cole C Budinsky, MD    Office: [phone]    This note was prepared using voice recognition software.  The details of this note are correct and have been reviewed, and corrected to the best of my ability.  Some grammatical errors may persist related to the Dragon software.   Albendazole Counseling:  I discussed with the patient the risks of albendazole including but not limited to cytopenia, kidney damage, nausea/vomiting and severe allergy.  The patient understands that this medication is being used in an off-label manner.

## 2025-04-14 ENCOUNTER — LAB OUTSIDE AN ENCOUNTER (OUTPATIENT)
Dept: URBAN - NONMETROPOLITAN AREA CLINIC 13 | Facility: CLINIC | Age: 55
End: 2025-04-14

## 2025-04-14 ENCOUNTER — OFFICE VISIT (OUTPATIENT)
Dept: URBAN - NONMETROPOLITAN AREA CLINIC 13 | Facility: CLINIC | Age: 55
End: 2025-04-14
Payer: COMMERCIAL

## 2025-04-14 DIAGNOSIS — Z12.11 COLON CANCER SCREENING: ICD-10-CM

## 2025-04-14 DIAGNOSIS — K22.70 BARRETT'S ESOPHAGUS WITHOUT DYSPLASIA: ICD-10-CM

## 2025-04-14 DIAGNOSIS — R05.1 ACUTE COUGH: ICD-10-CM

## 2025-04-14 DIAGNOSIS — K21.9 GERD WITHOUT ESOPHAGITIS: ICD-10-CM

## 2025-04-14 DIAGNOSIS — R19.4 BOWEL HABIT CHANGES: ICD-10-CM

## 2025-04-14 PROCEDURE — 99214 OFFICE O/P EST MOD 30 MIN: CPT | Performed by: NURSE PRACTITIONER

## 2025-04-14 RX ORDER — PANTOPRAZOLE SODIUM 40 MG/1
1 TABLET TABLET, DELAYED RELEASE ORAL ONCE A DAY
Qty: 90 TABLET | Refills: 3 | OUTPATIENT
Start: 2025-04-14

## 2025-04-14 RX ORDER — HYOSCYAMINE SULFATE 0.12 MG/1
1 TABLET AS NEEDED TABLET ORAL
Qty: 90 | Refills: 3 | Status: ACTIVE | COMMUNITY

## 2025-04-14 RX ORDER — PANTOPRAZOLE SODIUM 40 MG/1
1 TABLET TABLET, DELAYED RELEASE ORAL ONCE A DAY
Qty: 90 TABLET | Refills: 3 | Status: ACTIVE | COMMUNITY

## 2025-04-14 RX ORDER — HYOSCYAMINE SULFATE 0.12 MG/1
1 TABLET AS NEEDED TABLET ORAL
Qty: 90 | Refills: 3
Start: 2025-04-14

## 2025-04-14 RX ORDER — FEXOFENADINE HYDROCHLORIDE 180 MG/1
TABLET, FILM COATED ORAL
Qty: 0 | Refills: 0 | Status: ACTIVE | COMMUNITY
Start: 1900-01-01

## 2025-04-14 RX ORDER — SUCRALFATE 1 G/1
1 TABLET DISSOLVED IN A TABLESPOON OF WATER ON AN EMPTY STOMACH TABLET ORAL TWICE A DAY
Qty: 60 TABLET | Refills: 11 | Status: ACTIVE | COMMUNITY

## 2025-04-14 RX ORDER — COLESTIPOL HYDROCHLORIDE 1 G/1
1 TABLET 30MINS PRIOR TO A MEAL TABLET, FILM COATED ORAL TWICE A DAY
Qty: 60 TABLET | Refills: 11 | Status: ACTIVE | COMMUNITY

## 2025-04-14 RX ORDER — COLESTIPOL HYDROCHLORIDE 1 G/1
1 TABLET 30MINS PRIOR TO A MEAL TABLET, FILM COATED ORAL TWICE A DAY
Qty: 60 TABLET | Refills: 11 | OUTPATIENT
Start: 2025-04-14

## 2025-04-14 RX ORDER — ONDANSETRON 4 MG/1
1 TABLET ON THE TONGUE AND ALLOW TO DISSOLVE TABLET, ORALLY DISINTEGRATING ORAL
Qty: 30 | Refills: 3 | Status: ACTIVE | COMMUNITY

## 2025-04-14 RX ORDER — METFORMIN HYDROCHLORIDE 500 MG/1
1 TABLET WITH A MEAL TABLET, FILM COATED ORAL ONCE A DAY
Status: ACTIVE | COMMUNITY

## 2025-04-14 RX ORDER — FAMOTIDINE 40 MG/1
1 TABLET AT BEDTIME TABLET, FILM COATED ORAL ONCE A DAY
Qty: 90 TABLET | Refills: 3 | OUTPATIENT
Start: 2025-04-14

## 2025-04-14 RX ORDER — FLUTICASONE PROPIONATE 50 UG/1
SPRAY, METERED NASAL
Qty: 0 | Refills: 0 | Status: ACTIVE | COMMUNITY
Start: 1900-01-01

## 2025-04-14 RX ORDER — ONDANSETRON 4 MG/1
1 TABLET ON THE TONGUE AND ALLOW TO DISSOLVE TABLET, ORALLY DISINTEGRATING ORAL
Qty: 30 | Refills: 3 | OUTPATIENT
Start: 2025-04-14

## 2025-04-14 RX ORDER — AMITRIPTYLINE HYDROCHLORIDE 25 MG/1
1 TABLET AT BEDTIME TABLET, FILM COATED ORAL ONCE A DAY
Qty: 90 TABLET | Refills: 3 | Status: ACTIVE | COMMUNITY

## 2025-04-14 RX ORDER — CYCLOBENZAPRINE HYDROCHLORIDE 5 MG/1
1 TABLET AT BEDTIME AS NEEDED TABLET, FILM COATED ORAL ONCE A DAY
Status: ACTIVE | COMMUNITY

## 2025-04-14 RX ORDER — AMITRIPTYLINE HYDROCHLORIDE 25 MG/1
1 TABLET AT BEDTIME TABLET, FILM COATED ORAL ONCE A DAY
Qty: 90 TABLET | Refills: 3 | OUTPATIENT
Start: 2025-04-14

## 2025-04-14 RX ORDER — BUPROPION HYDROCHLORIDE 300 MG/1
1 TABLET IN THE MORNING TABLET, EXTENDED RELEASE ORAL ONCE A DAY
Status: ACTIVE | COMMUNITY

## 2025-04-14 RX ORDER — CLONIDINE HYDROCHLORIDE 0.1 MG/1
1 TABLET AT BEDTIME TABLET, EXTENDED RELEASE ORAL ONCE A DAY
Status: ACTIVE | COMMUNITY

## 2025-04-14 RX ORDER — SUCRALFATE 1 G/1
1 TABLET DISSOLVED IN A TABLESPOON OF WATER ON AN EMPTY STOMACH TABLET ORAL TWICE A DAY
Qty: 60 TABLET | Refills: 11 | OUTPATIENT
Start: 2025-04-14

## 2025-04-14 RX ORDER — CETIRIZINE HYDROCHLORIDE 10 MG/1
TABLET, FILM COATED ORAL
Qty: 0 | Refills: 0 | Status: ACTIVE | COMMUNITY
Start: 1900-01-01

## 2025-04-14 RX ORDER — FAMOTIDINE 40 MG/1
1 TABLET AT BEDTIME TABLET, FILM COATED ORAL ONCE A DAY
Qty: 90 TABLET | Refills: 3 | Status: ACTIVE | COMMUNITY

## 2025-04-14 NOTE — HPI-OTHER HISTORIES
5/6/2022 Ms. Chelsie Ash is a 51 year old female here for epigastric pain and reflux. She was last seen in 2018 for hemorrhoids. She is a previous patient of Dr Castillo. Her last EGD was 10/2016 for painful swallowing. Her EGD was normal except for gastritis and benign polyps. She has been on nexium 20mg daily since she was pregnant many years ago. Over this last year, she has had a lot of stress. She has been having more epigastric pain and discomfort. She is taking maalox most days. She is concerned about esophageal damage and barretts. She has failed omeprazole in the past. Her last colonoscopy was 2015 with Dr Castillo with two TA polyps. She is due for colonoscopy. CS  8/23/2022 EGD: 0.5cm salmon colored mucousa, gastritis, gastric polyps. Path negative for Barretts Colonoscopy: 8mm TA polyp.  10/27/2022 Ms. Chelsie Ash is here for epigastric pain and reflux.  Her EGD showed possible Barretts- path negative, gastritis and benign polyps. She was given protonxi 40mg BID, carafate, and levsin. She called after her EGD and was still having a lot of pain, burning, and nausea. She could not get in. She saw her GYN and started on Vyvance and her hormones were stopped. This has really helped. She has been able to come off the carafate, levsin, and drop the protonix to once daily. Her stools are now a little loose.  CS  3/15/2023 Ms. Chelsie Ash is here for epigastric pain and reflux.  Her EGD showed possible Barretts- path negative, gastritis and benign polyps. She was given protonxi 40mg BID, carafate, and levsin. She called after her EGD and was still having a lot of pain, burning, and nausea. She could not get in. She saw her GYN and started on Vyvance and her hormones were stopped. This has really helped. She was able to come off the carafate, levsin, and drop the protonix to once daily.. This was ok until the last few months. Her reflux has returned. She is back to taking maalox. If she misses one dose of protonix she is miserable and maalox does not help. She has been under a lot of stress and she feels this is contributing to her symptoms. CS  6/27/2023 Ms. Chelsie Ash is here for epigastric pain and reflux.  Her EGD showed possible Barretts- path negative, gastritis and benign polyps in August. She was given protonxi 40mg BID, carafate, and levsin. This improved and then returned when she came off everything but the protonix daily. She restarted a week ago with improvement. She is seeing cardiology in August. She is preparing for a trip to [place]. She is also complaining of urgent loose stool after eating out. She had her GB out in 2018. Her bowels are normal otherwise. CS 9/27/2023 Ms. Chelsie Ash is here for f/u of epigastric pain and reflux. Her EGD showed possible Barretts- path negative, gastritis and benign polyps in August 2022. She was given protonxi 40mg BID, carafate, and levsin. This improved and then returned when she came off everything but the protonix daily. She started having a return of epigastric pain and reflux in June. She restarted the carafate and levsin with improvement. Today, she has been able to wean back to once a day protonix. She will take the carafate and pepcid prn. She continues to wake at 2-4 am with a racing heart. She did a holter for a month and her heart rate never changed. CS  1/4/2024 Ms. Chelsie Ash is here for f/u of epigastric pain and reflux. Her EGD showed possible Barretts- path negative, gastritis and benign polyps in August 2022. She has been taking protonix 40mg in the morning, pepcid and carafate prn, and levsin at bedtime. She has not had any further episodes of heart racing. She has noticed that stress contributes to her reflux. The pepcid and carafate are helping. She is not sure if her welbutrin is working. She is on trazadone but rarely takes it. CS  2/14/2024 Ms. Chelsie Ash is here for f/u of epigastric pain and reflux. Her EGD showed possible Barretts- path negative, gastritis and benign polyps in August 2022. Currently her reflux is doing better with protonix 40mg in the morning, carafate prn, and levsin at bedtime. She will have flares and take a second protonix. The pepcid is not covered by insurance. Since adding AMT, she has noticed her nausea has been better overall but she still had a bad episode after a stressful event. CS  7/30/2024 Ms. Holguin -Deniz is here for f/u of epigastric pain and reflux. Her EGD showed possible Barretts- path negative, gastritis and benign polyps in August 2022. Currently her reflux is doing better with protonix 40mg in the morning, carafate prn, and levsin prn at bedtime. She will have flares and take a second protonix for a week and it will resovle. She is on AMT 25mg and her PCP add buspar BID. She feels she is in a good place and over all her symptoms are manageable. CS

## 2025-04-14 NOTE — HPI-TODAY'S VISIT:
4/14/2025 Ms. Holguin -Deniz is here for f/u of epigastric pain and reflux. She had done really well over the last year. Her reflux has been well controlled with protonix 40mg daily, AMT and Buspar. She has been managing her stress better. Her bowels are fairly normal. She is due to repeat EGD. She will still have some cough and vomiting after heavy exercise. CS

## 2025-06-17 ENCOUNTER — CLAIMS CREATED FROM THE CLAIM WINDOW (OUTPATIENT)
Dept: URBAN - NONMETROPOLITAN AREA SURGERY CENTER 1 | Facility: SURGERY CENTER | Age: 55
End: 2025-06-17
Payer: COMMERCIAL

## 2025-06-17 ENCOUNTER — CLAIMS CREATED FROM THE CLAIM WINDOW (OUTPATIENT)
Dept: URBAN - METROPOLITAN AREA CLINIC 4 | Facility: CLINIC | Age: 55
End: 2025-06-17
Payer: COMMERCIAL

## 2025-06-17 DIAGNOSIS — K21.9 GASTRO-ESOPHAGEAL REFLUX DISEASE WITHOUT ESOPHAGITIS: ICD-10-CM

## 2025-06-17 DIAGNOSIS — K31.7 POLYP OF STOMACH AND DUODENUM: ICD-10-CM

## 2025-06-17 DIAGNOSIS — K21.9 ACID REFLUX: ICD-10-CM

## 2025-06-17 DIAGNOSIS — K31.7 POLYP OF STOMACH FUNDIC GLAND: ICD-10-CM

## 2025-06-17 DIAGNOSIS — K31.7 BENIGN GASTRIC POLYP: ICD-10-CM

## 2025-06-17 DIAGNOSIS — E66.01 MORBID (SEVERE) OBESITY DUE TO EXCESS CALORIES: ICD-10-CM

## 2025-06-17 PROCEDURE — 00731 ANES UPR GI NDSC PX NOS: CPT | Performed by: NURSE ANESTHETIST, CERTIFIED REGISTERED

## 2025-06-17 PROCEDURE — 88305 TISSUE EXAM BY PATHOLOGIST: CPT | Performed by: PATHOLOGY

## 2025-06-17 PROCEDURE — 88313 SPECIAL STAINS GROUP 2: CPT | Performed by: PATHOLOGY

## 2025-06-17 PROCEDURE — 43239 EGD BIOPSY SINGLE/MULTIPLE: CPT | Performed by: INTERNAL MEDICINE

## 2025-06-17 PROCEDURE — 88312 SPECIAL STAINS GROUP 1: CPT | Performed by: PATHOLOGY

## 2025-08-06 ENCOUNTER — OFFICE VISIT (OUTPATIENT)
Dept: URBAN - NONMETROPOLITAN AREA CLINIC 13 | Facility: CLINIC | Age: 55
End: 2025-08-06
Payer: COMMERCIAL

## 2025-08-06 DIAGNOSIS — R05.1 ACUTE COUGH: ICD-10-CM

## 2025-08-06 DIAGNOSIS — Z12.11 COLON CANCER SCREENING: ICD-10-CM

## 2025-08-06 DIAGNOSIS — K22.70 BARRETT'S ESOPHAGUS WITHOUT DYSPLASIA: ICD-10-CM

## 2025-08-06 DIAGNOSIS — R19.4 BOWEL HABIT CHANGES: ICD-10-CM

## 2025-08-06 DIAGNOSIS — K21.9 GERD WITHOUT ESOPHAGITIS: ICD-10-CM

## 2025-08-06 PROCEDURE — 99213 OFFICE O/P EST LOW 20 MIN: CPT | Performed by: NURSE PRACTITIONER

## 2025-08-06 RX ORDER — COLESTIPOL HYDROCHLORIDE 1 G/1
1 TABLET 30MINS PRIOR TO A MEAL TABLET, FILM COATED ORAL TWICE A DAY
Qty: 60 TABLET | Refills: 11 | OUTPATIENT
Start: 2025-08-06

## 2025-08-06 RX ORDER — SUCRALFATE 1 G/1
1 TABLET DISSOLVED IN A TABLESPOON OF WATER ON AN EMPTY STOMACH TABLET ORAL TWICE A DAY
Qty: 60 TABLET | Refills: 11 | OUTPATIENT
Start: 2025-08-06

## 2025-08-06 RX ORDER — ONDANSETRON 4 MG/1
1 TABLET ON THE TONGUE AND ALLOW TO DISSOLVE TABLET, ORALLY DISINTEGRATING ORAL
Qty: 30 | Refills: 3 | Status: ACTIVE | COMMUNITY
Start: 2025-04-14

## 2025-08-06 RX ORDER — METFORMIN HYDROCHLORIDE 500 MG/1
1 TABLET WITH A MEAL TABLET, FILM COATED ORAL ONCE A DAY
Status: ACTIVE | COMMUNITY

## 2025-08-06 RX ORDER — COLESTIPOL HYDROCHLORIDE 1 G/1
1 TABLET 30MINS PRIOR TO A MEAL TABLET, FILM COATED ORAL TWICE A DAY
Qty: 60 TABLET | Refills: 11 | Status: ACTIVE | COMMUNITY
Start: 2025-04-14

## 2025-08-06 RX ORDER — CLONIDINE HYDROCHLORIDE 0.1 MG/1
1 TABLET AT BEDTIME TABLET, EXTENDED RELEASE ORAL ONCE A DAY
Status: ACTIVE | COMMUNITY

## 2025-08-06 RX ORDER — AMITRIPTYLINE HYDROCHLORIDE 25 MG/1
1 TABLET AT BEDTIME TABLET, FILM COATED ORAL ONCE A DAY
Qty: 90 TABLET | Refills: 3 | Status: ACTIVE | COMMUNITY
Start: 2025-04-14

## 2025-08-06 RX ORDER — BUPROPION HYDROCHLORIDE 300 MG/1
1 TABLET IN THE MORNING TABLET, EXTENDED RELEASE ORAL ONCE A DAY
Status: ACTIVE | COMMUNITY

## 2025-08-06 RX ORDER — FAMOTIDINE 40 MG/1
1 TABLET AT BEDTIME TABLET, FILM COATED ORAL ONCE A DAY
Qty: 90 TABLET | Refills: 3 | OUTPATIENT
Start: 2025-08-06

## 2025-08-06 RX ORDER — CYCLOBENZAPRINE HYDROCHLORIDE 5 MG/1
1 TABLET AT BEDTIME AS NEEDED TABLET, FILM COATED ORAL ONCE A DAY
Status: ACTIVE | COMMUNITY

## 2025-08-06 RX ORDER — CETIRIZINE HYDROCHLORIDE 10 MG/1
TABLET, FILM COATED ORAL
Qty: 0 | Refills: 0 | Status: ACTIVE | COMMUNITY
Start: 1900-01-01

## 2025-08-06 RX ORDER — ESTRADIOL 2 MG/1
1 TABLET TABLET ORAL ONCE A DAY
Status: ACTIVE | COMMUNITY

## 2025-08-06 RX ORDER — FAMOTIDINE 40 MG/1
1 TABLET AT BEDTIME TABLET, FILM COATED ORAL ONCE A DAY
Qty: 90 TABLET | Refills: 3 | Status: ACTIVE | COMMUNITY
Start: 2025-04-14

## 2025-08-06 RX ORDER — PANTOPRAZOLE SODIUM 40 MG/1
1 TABLET TABLET, DELAYED RELEASE ORAL ONCE A DAY
Qty: 90 TABLET | Refills: 3 | OUTPATIENT
Start: 2025-08-06

## 2025-08-06 RX ORDER — SUCRALFATE 1 G/1
1 TABLET DISSOLVED IN A TABLESPOON OF WATER ON AN EMPTY STOMACH TABLET ORAL TWICE A DAY
Qty: 60 TABLET | Refills: 11 | Status: ACTIVE | COMMUNITY
Start: 2025-04-14

## 2025-08-06 RX ORDER — AMITRIPTYLINE HYDROCHLORIDE 25 MG/1
1 TABLET AT BEDTIME TABLET, FILM COATED ORAL ONCE A DAY
Qty: 90 TABLET | Refills: 3 | OUTPATIENT
Start: 2025-08-06

## 2025-08-06 RX ORDER — HYOSCYAMINE SULFATE 0.12 MG/1
1 TABLET AS NEEDED TABLET ORAL
Qty: 90 | Refills: 3 | Status: ACTIVE | COMMUNITY
Start: 2025-04-14

## 2025-08-06 RX ORDER — FEXOFENADINE HYDROCHLORIDE 180 MG/1
TABLET, FILM COATED ORAL
Qty: 0 | Refills: 0 | Status: ACTIVE | COMMUNITY
Start: 1900-01-01

## 2025-08-06 RX ORDER — PANTOPRAZOLE SODIUM 40 MG/1
1 TABLET TABLET, DELAYED RELEASE ORAL ONCE A DAY
Qty: 90 TABLET | Refills: 3 | Status: ACTIVE | COMMUNITY
Start: 2025-04-14

## 2025-08-06 RX ORDER — HYOSCYAMINE SULFATE 0.12 MG/1
1 TABLET AS NEEDED TABLET ORAL
Qty: 90 | Refills: 3
Start: 2025-08-06

## 2025-08-06 RX ORDER — FLUTICASONE PROPIONATE 50 UG/1
SPRAY, METERED NASAL
Qty: 0 | Refills: 0 | Status: ACTIVE | COMMUNITY
Start: 1900-01-01

## 2025-08-06 RX ORDER — ONDANSETRON 4 MG/1
1 TABLET ON THE TONGUE AND ALLOW TO DISSOLVE TABLET, ORALLY DISINTEGRATING ORAL
Qty: 30 | Refills: 3 | OUTPATIENT
Start: 2025-08-06